# Patient Record
Sex: MALE | Race: WHITE | URBAN - METROPOLITAN AREA
[De-identification: names, ages, dates, MRNs, and addresses within clinical notes are randomized per-mention and may not be internally consistent; named-entity substitution may affect disease eponyms.]

---

## 2017-02-23 ENCOUNTER — OFFICE VISIT (OUTPATIENT)
Dept: FAMILY MEDICINE | Facility: CLINIC | Age: 52
End: 2017-02-23
Payer: COMMERCIAL

## 2017-02-23 VITALS
WEIGHT: 281.4 LBS | HEART RATE: 72 BPM | SYSTOLIC BLOOD PRESSURE: 118 MMHG | DIASTOLIC BLOOD PRESSURE: 72 MMHG | BODY MASS INDEX: 38.16 KG/M2

## 2017-02-23 DIAGNOSIS — D56.3 BETA THALASSEMIA MINOR: Chronic | ICD-10-CM

## 2017-02-23 DIAGNOSIS — F41.9 ANXIETY: ICD-10-CM

## 2017-02-23 DIAGNOSIS — G89.29 CHRONIC PATELLOFEMORAL PAIN OF LEFT KNEE: ICD-10-CM

## 2017-02-23 DIAGNOSIS — F33.1 MAJOR DEPRESSIVE DISORDER, RECURRENT EPISODE, MODERATE (H): Primary | ICD-10-CM

## 2017-02-23 DIAGNOSIS — M25.562 CHRONIC PATELLOFEMORAL PAIN OF LEFT KNEE: ICD-10-CM

## 2017-02-23 PROCEDURE — 99214 OFFICE O/P EST MOD 30 MIN: CPT | Performed by: FAMILY MEDICINE

## 2017-02-23 RX ORDER — QUETIAPINE FUMARATE 200 MG/1
TABLET, FILM COATED ORAL
Qty: 90 TABLET | Refills: 3 | Status: SHIPPED | OUTPATIENT
Start: 2017-02-23 | End: 2020-06-03

## 2017-02-23 NOTE — PROGRESS NOTES
SUBJECTIVE:                                                    Eric Forbes is a 51 year old male who presents to clinic today for the following health issues:      Chief Complaint   Patient presents with     Recheck Medication     Knee left     For a period time he had taken himself off of the Seroquel. However, he has found that he does better when he is on it. It really helps him to sleep better and that improves his overall functioning and anxiety level. He tries to take it early in the evening because if he takes it at bedtime he will be too drowsy the next day.    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 What he describes is some crepitation in the knee with occasional pain. He denies instability or locking          Problem list and histories reviewed & adjusted, as indicated.  Additional history: none              ROS:  Constitutional, HEENT, cardiovascular, pulmonary, gi and gu systems are negative, except as otherwise noted.        OBJECTIVE:                                                    /72 (Cuff Size: Adult Large)  Pulse 72  Wt 281 lb 6.4 oz (127.6 kg)  BMI 38.16 kg/m2    GENERAL: healthy, alert and no distress  EYES: Eyes grossly normal to inspection, extraocular movements - intact, and PERRL  MS: Left knee exam; no effusion or warmth, no joint line tenderness; medial and lateral collateral ligaments are intact stressing, Lachman's test negative; moderate pain with palpation on the inferior aspect of the patella  SKIN: Reddish purple scar on the lateral inferior aspect of the skin over the patella from a healed abrasion; no sign of active infection  PSYCH: Alert and oriented times 3; coherent speech, normal rate and volume; does tend to perseverate about worries about serious problem such as flesh  eating bacteria; moderate anxiety       ASSESSMENT/PLAN:                                                    ASSESSMENT:  1. Major depressive disorder, recurrent episode, moderate (H)    2. Beta thalassemia minor    3. Chronic patellofemoral pain of left knee    4. Anxiety      Discussed pathophysiology of patellofemoral pain and implications.  Questions answered.     PLAN:  Orders Placed This Encounter     FOLIC ACID PO     QUEtiapine (SEROQUEL) 200 MG tablet       Patient Instructions   For the knee, do quad strengthening exercises daily (straight leg lifts) and avoid squatting and kneeling      BELINDA Goldberg  [facility]

## 2017-02-23 NOTE — NURSING NOTE
Chief Complaint   Patient presents with     Recheck Medication     Knee left       Initial /72 (Cuff Size: Adult Large)  Pulse 72  Wt 281 lb 6.4 oz (127.6 kg)  BMI 38.16 kg/m2 Estimated body mass index is 38.16 kg/(m^2) as calculated from the following:    Height as of 12/29/16: 6' (1.829 m).    Weight as of this encounter: 281 lb 6.4 oz (127.6 kg).  Medication Reconciliation: complete   Tonia Card CMA

## 2017-02-23 NOTE — PATIENT INSTRUCTIONS
For the knee, do quad strengthening exercises daily (straight leg lifts) and avoid squatting and kneeling

## 2017-02-23 NOTE — MR AVS SNAPSHOT
"              After Visit Summary   2017    Eric Forbes    MRN: [id number]           Patient Information     Date Of Birth          1965        Visit Information        Provider Department      2017 8:00 AM BELINDA Goldberg MD [facility]        Today's Diagnoses     Major depressive disorder, recurrent episode, moderate (H)    -  1      Care Instructions    For the knee, do quad strengthening exercises daily (straight leg lifts) and avoid squatting and kneeling        Follow-ups after your visit        Who to contact     If you have questions or need follow up information about today's clinic visit or your schedule please contact [facility] directly at [phone].  Normal or non-critical lab and imaging results will be communicated to you by MyChart, letter or phone within 4 business days after the clinic has received the results. If you do not hear from us within 7 days, please contact the clinic through MyChart or phone. If you have a critical or abnormal lab result, we will notify you by phone as soon as possible.  Submit refill requests through [employer] or call your pharmacy and they will forward the refill request to us. Please allow 3 business days for your refill to be completed.          Additional Information About Your Visit        MyChart Information     [employer] lets you send messages to your doctor, view your test results, renew your prescriptions, schedule appointments and more. To sign up, go to [email or web address] . Click on \"Log in\" on the left side of the screen, which will take you to the Welcome page. Then click on \"Sign up Now\" on the right side of the page.     You will be asked to enter the access code listed below, as well as some personal information. Please follow the directions to create your username and password.     Your access code is: [email or web address]  Expires: 3/29/2017  8:09 AM     Your access code will  in 90 days. If you " need help or a new code, please call your [facility] clinic or [phone].        Care EveryWhere ID     This is your Care EveryWhere ID. This could be used by other organizations to access your [facility] medical records  [id number]        Your Vitals Were     Pulse BMI (Body Mass Index)                72 38.16 kg/m2           Blood Pressure from Last 3 Encounters:   02/23/17 118/72   12/29/16 (!) 153/92   05/27/16 138/86    Weight from Last 3 Encounters:   02/23/17 281 lb 6.4 oz (127.6 kg)   12/29/16 291 lb (132 kg)   05/27/16 275 lb (124.7 kg)              Today, you had the following     No orders found for display         Today's Medication Changes          These changes are accurate as of: 2/23/17  8:24 AM.  If you have any questions, ask your nurse or doctor.               Start taking these medicines.        Dose/Directions    QUEtiapine 200 MG tablet   Commonly known as:  SEROquel   Used for:  Major depressive disorder, recurrent episode, moderate (H)   Started by:  BELINDA Goldberg MD        One tablet in the evening   Quantity:  90 tablet   Refills:  3            Where to get your medicines      These medications were sent to [place] [address] [address] [facility] [address]     Phone:  [phone]     QUEtiapine 200 MG tablet                Primary Care Provider Office Phone # Fax #    Rj Sharpe [phone]       [facility] [address] [facility] [address]        Thank you!     Thank you for choosing [facility]  for your care. Our goal is always to provide you with excellent care. Hearing back from our patients is one way we can continue to improve our services. Please take a few minutes to complete the written survey that you may receive in the mail after your visit with us. Thank you!             Your Updated Medication List - Protect others around you: Learn how to safely use, store and throw away your  medicines at www.disposemymeds.org.          This list is accurate as of: 2/23/17  8:24 AM.  Always use your most recent med list.                   Brand Name Dispense Instructions for use    cyclobenzaprine 5 MG tablet    FLEXERIL    30 tablet    Take 1 tablet (5 mg) by mouth nightly as needed for muscle spasms       FOLIC ACID PO      Take 1 mg by mouth daily       LORazepam 2 MG tablet    ATIVAN     Reported on 2/23/2017       NONFORMULARY          QUEtiapine 200 MG tablet    SEROquel    90 tablet    One tablet in the evening       VITAMIN D PO      5000 u daily  given by his chiropractor

## 2017-02-24 ASSESSMENT — PATIENT HEALTH QUESTIONNAIRE - PHQ9: SUM OF ALL RESPONSES TO PHQ QUESTIONS 1-9: 10

## 2018-06-15 LAB
ALT SERPL-CCNC: 17 IU/L (ref 8–45)
AST SERPL-CCNC: 17 IU/L (ref 2–40)
CREAT SERPL-MCNC: 0.99 MG/DL (ref 0.72–1.25)
GFR SERPL CREATININE-BSD FRML MDRD: >60 ML/MIN/1.73M2
GLUCOSE SERPL-MCNC: 77 MG/DL (ref 65–100)
POTASSIUM SERPL-SCNC: 4.2 MMOL/L (ref 3.5–5)
TSH SERPL-ACNC: 0.92 UIU/ML (ref 0.35–4.94)

## 2018-09-06 ENCOUNTER — TRANSFERRED RECORDS (OUTPATIENT)
Dept: HEALTH INFORMATION MANAGEMENT | Facility: CLINIC | Age: 53
End: 2018-09-06

## 2019-08-16 ENCOUNTER — ANCILLARY PROCEDURE (OUTPATIENT)
Dept: GENERAL RADIOLOGY | Facility: CLINIC | Age: 54
End: 2019-08-16
Attending: NURSE PRACTITIONER
Payer: COMMERCIAL

## 2019-08-16 ENCOUNTER — NURSE TRIAGE (OUTPATIENT)
Dept: NURSING | Facility: CLINIC | Age: 54
End: 2019-08-16

## 2019-08-16 ENCOUNTER — OFFICE VISIT (OUTPATIENT)
Dept: FAMILY MEDICINE | Facility: CLINIC | Age: 54
End: 2019-08-16
Payer: COMMERCIAL

## 2019-08-16 VITALS
TEMPERATURE: 97.1 F | SYSTOLIC BLOOD PRESSURE: 138 MMHG | WEIGHT: 275 LBS | OXYGEN SATURATION: 97 % | HEIGHT: 72 IN | DIASTOLIC BLOOD PRESSURE: 70 MMHG | BODY MASS INDEX: 37.25 KG/M2 | HEART RATE: 86 BPM

## 2019-08-16 DIAGNOSIS — M16.0 PRIMARY OSTEOARTHRITIS OF BOTH HIPS: Primary | ICD-10-CM

## 2019-08-16 DIAGNOSIS — M25.552 BILATERAL HIP PAIN: Primary | ICD-10-CM

## 2019-08-16 DIAGNOSIS — G89.29 CHRONIC PAIN OF BOTH KNEES: ICD-10-CM

## 2019-08-16 DIAGNOSIS — M25.552 BILATERAL HIP PAIN: ICD-10-CM

## 2019-08-16 DIAGNOSIS — M25.551 BILATERAL HIP PAIN: Primary | ICD-10-CM

## 2019-08-16 DIAGNOSIS — M25.561 CHRONIC PAIN OF BOTH KNEES: ICD-10-CM

## 2019-08-16 DIAGNOSIS — M25.562 CHRONIC PAIN OF BOTH KNEES: ICD-10-CM

## 2019-08-16 DIAGNOSIS — M25.569 KNEE PAIN: ICD-10-CM

## 2019-08-16 DIAGNOSIS — M25.551 BILATERAL HIP PAIN: ICD-10-CM

## 2019-08-16 PROCEDURE — 99213 OFFICE O/P EST LOW 20 MIN: CPT | Performed by: NURSE PRACTITIONER

## 2019-08-16 PROCEDURE — 73523 X-RAY EXAM HIPS BI 5/> VIEWS: CPT

## 2019-08-16 PROCEDURE — 73560 X-RAY EXAM OF KNEE 1 OR 2: CPT | Mod: LT

## 2019-08-16 ASSESSMENT — MIFFLIN-ST. JEOR: SCORE: 2135.39

## 2019-08-16 NOTE — PATIENT INSTRUCTIONS
Your clinic record indicates that you are due for:   Colonoscopy or yearly stool blood testing (FIT)        Thank you for choosing [facility].  You may be receiving an email and/or telephone survey request from [facility] [employer] regarding your visit today.  Please take a few minutes to respond to the survey to let us know how we are doing.      If you have questions or concerns, please contact us via [employer] or you can contact your care team at [phone].    Our Clinic hours are:  Monday 6:40 am  to 7:00 pm  Tuesday -Friday 6:40 am to 5:00 pm    The [place] outpatient lab hours are:  Monday - Friday 6:10 am to 4:45 pm  Saturdays 7:00 am to 11:00 am  Appointments are required, call [phone]    If you have clinical questions after hours or would like to schedule an appointment,  call the clinic at [phone].

## 2019-08-16 NOTE — TELEPHONE ENCOUNTER
Pt asking for results of recent labs to be mailed to him. Informed clinic now closed. Advised call back Mon 8/19 when clinic reopens.     Reason for Disposition    [1] Caller requesting NON-URGENT health information AND [2] PCP's office is the best resource    Additional Information    Negative: [1] Caller is not with the adult (patient) AND [2] reporting urgent symptoms    Negative: Lab result questions    Negative: Medication questions    Negative: Caller can't be reached by phone    Negative: Caller has already spoken to PCP or another triager    Negative: RN needs further essential information from caller in order to complete triage    Negative: Requesting regular office appointment    Protocols used: INFORMATION ONLY CALL-A-

## 2019-08-16 NOTE — PROGRESS NOTES
"Subjective     Eric Forbes is a 53 year old male who presents to clinic today for the following health issues:    HPI   Musculoskeletal problem/pain      Duration: knee pain for a couple years    Bilateral Hip pain  8 months- comes and goes; getting worse    Description  Location: bilateral hip and knee pain    Intensity:  6/10    Accompanying signs and symptoms:     numbness and tingling in right leg    No redness or swelling.    No weakness.    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 Doesn't take any over-the-counter pain medications, states \"it's not bad enough\"              Reviewed and updated as needed this visit by Provider  Tobacco  Allergies  Meds  Problems  Med Hx  Surg Hx  Fam Hx         Review of Systems   ROS COMP: Constitutional, HEENT, cardiovascular, pulmonary, gi and gu systems are negative, except as otherwise noted.      Objective    /70 (BP Location: Right arm)   Pulse 86   Temp 97.1  F (36.2  C) (Tympanic)   Ht 1.829 m (6')   Wt 124.7 kg (275 lb)   SpO2 97%   BMI 37.30 kg/m    Body mass index is 37.3 kg/m .  Physical Exam   GENERAL: healthy, alert and no distress  MS: hip exam Hip appears normal, non-tender and normal range of motion  knee exam normal knee exam, no swelling, tenderness, effusion or instability; ligaments intact, full ROM.            Assessment & Plan       ICD-10-CM    1. Bilateral hip pain M25.551 XR Pelvis and Hip Bilateral 2 Views    M25.552    2. Chronic pain of both knees M25.561     M25.562     G89.29      Suspect osteoarthritis is cause of pain.  Will obtain xrays today to further assess.  Discussed weight loss.  Discussed " NSAIDs   Discussed importance of strength building and exercising.  Discussed option of steroid injections.  Patient will decide after xrays are completed.     BMI:   Estimated body mass index is 37.3 kg/m  as calculated from the following:    Height as of this encounter: 1.829 m (6').    Weight as of this encounter: 124.7 kg (275 lb).   Weight management plan: Discussed healthy diet and exercise guidelines        Return in about 2 weeks (around 8/30/2019), or if symptoms worsen or fail to improve.    The risks, benefits and treatment options of prescribed medications or other treatments have been discussed with the patient. The patient verbalized their understanding and should call or follow up if no improvement or if they develop further problems.    KAMALJIT Donovan CNP  [facility]

## 2019-10-25 ENCOUNTER — TELEPHONE (OUTPATIENT)
Dept: FAMILY MEDICINE | Facility: CLINIC | Age: 54
End: 2019-10-25

## 2019-10-25 NOTE — LETTER
October 25, 2019      Eric Forbes  [address] [facility] [address]        Dear Eric Forbes, [id number]    At [facility] we care about your health and are committed to providing quality patient care, which includes staying current on preventative cancer screenings.  You can increase your chances of finding and treating cancers through regular screenings.      Our records show that you are due for the following screening(s):    Colon Cancer Screening- Recommended every 5-10 years, depending on your history, in order to prevent and detect colon cancer at its earliest stages.  Colon cancer is now the second leading cause of death in the [place] for both men and women and there are over 130,000 new cases and 50,000 deaths per year from colon cancer.  Colonoscopies can prevent 90-95% of these deaths.  Problem lesions can be removed before they ever become cancer.  This test is not only looking for cancer, but also getting rid of precancerous lesions.  You are usually given some sedation which makes the test very comfortable for most people.     If you do not wish to do a colonoscopy or cannot afford to do one, at this time, there is another option.  It is called a Fit test or Fecal Immunochemical Occult Blood Test (take home stool sample kit).  It does not replace the colonoscopy for colorectal cancer screening, but it can detect hidden bleeding in the lower colon.  It does need to be repeated every year and if a positive result is obtained, you would be referred for a colonoscopy.  The FIT test is really easy to do and does not require any diet or medication restrictions and involves only one collection sample.       If you have completed either one of these tests or had a flexible sigmoidoscopy in the past five years at another facility, please let us know so that we can update your records.  Please call us ([phone]) if you have questions or would like to arrange either to do a  colonoscopy or obtain the necessary test kit for the Fecal Occult Test.         Quality Committee   [facility]

## 2019-10-25 NOTE — TELEPHONE ENCOUNTER
Panel Management Review          Composite cancer screening  Chart review shows that this patient is due/due soon for the following Colonoscopy  Summary:    Patient is due/failing the following:   COLONOSCOPY    Action needed:   Patient needs referral/order: for colonoscopy vs. FIT    Type of outreach:    Sent letter.    Questions for provider review:    None                                                                                                                                    TOBY LUX

## 2020-03-05 ENCOUNTER — TELEPHONE (OUTPATIENT)
Dept: FAMILY MEDICINE | Facility: CLINIC | Age: 55
End: 2020-03-05

## 2020-03-05 NOTE — LETTER
[facility]  [address] [facility] [address]  [phone]    March 5, 2020    Eric Forbes  [address] [facility] [address]          Dear Eric,    --Colon cancer screening is generally recommended in all patients over the age of 50 years of age. This can be with either colonoscopy every 10 years, or testing the stool for blood one time per year (called a FIT test, a simple card you can send back to us in the mail). On review of our electronic medical record it appears you are due for colon cancer screening. Please call the clinic to assist in setting up a colonoscopy or, if you prefer, setting up the test for stool blood(FIT).    If you have had this test at an outside facility, please let us know so that we can update your record.     Please disregard this notice if you have already scheduled an appointment.     Sincerely,    Raine ARRIAGA  [facility] - [phone]  [email or web address]

## 2020-03-05 NOTE — TELEPHONE ENCOUNTER
Panel Management Review          Composite cancer screening  Chart review shows that this patient is due/due soon for the following Colonoscopy  Summary:    Patient is due/failing the following:   COLONOSCOPY    Action needed:   Patient needs referral/order: for colonoscopy vs. FIT    Type of outreach:    Sent letter.    Questions for provider review:    None                                                                                                                                    TOBY ULX

## 2020-03-12 ENCOUNTER — TELEPHONE (OUTPATIENT)
Dept: FAMILY MEDICINE | Facility: CLINIC | Age: 55
End: 2020-03-12

## 2020-03-12 NOTE — TELEPHONE ENCOUNTER
Patient notified he will need an appt for this.   He has not tried OTC meds and will try.  Will make appt when he returns to town.  Sera CASTILLO RN

## 2020-03-12 NOTE — TELEPHONE ENCOUNTER
Reason for Call:  Other prescription    Detailed comments: Patient is calling to request a sleeping medication.  States that he was on Seroquel a couple years ago but he always felt tired on that.  Patient is out of town for work so can not come in for an appt.  He will be out of town for a couple weeks.  States that he needs something today.  [facility]  Address: [address] [place] [address], [place] [address].   - [phone]    Phone Number Patient can be reached at: Cell number on file:    Telephone Information:   Mobile [phone]       Best Time: Any    Can we leave a detailed message on this number? YES    Call taken on 3/12/2020 at 9:27 AM by Jessica Garcia

## 2020-05-14 ENCOUNTER — TRANSFERRED RECORDS (OUTPATIENT)
Dept: HEALTH INFORMATION MANAGEMENT | Facility: CLINIC | Age: 55
End: 2020-05-14

## 2020-06-03 ENCOUNTER — VIRTUAL VISIT (OUTPATIENT)
Dept: FAMILY MEDICINE | Facility: CLINIC | Age: 55
End: 2020-06-03
Payer: COMMERCIAL

## 2020-06-03 DIAGNOSIS — G47.00 INSOMNIA, UNSPECIFIED TYPE: ICD-10-CM

## 2020-06-03 DIAGNOSIS — F32.1 MODERATE MAJOR DEPRESSION (H): Primary | ICD-10-CM

## 2020-06-03 DIAGNOSIS — G47.33 OSA (OBSTRUCTIVE SLEEP APNEA): ICD-10-CM

## 2020-06-03 PROCEDURE — 99214 OFFICE O/P EST MOD 30 MIN: CPT | Mod: 95 | Performed by: FAMILY MEDICINE

## 2020-06-03 RX ORDER — TRAZODONE HYDROCHLORIDE 100 MG/1
100 TABLET ORAL AT BEDTIME
Qty: 90 TABLET | Refills: 3 | Status: SHIPPED | OUTPATIENT
Start: 2020-06-03 | End: 2022-06-03

## 2020-06-03 RX ORDER — TRAZODONE HYDROCHLORIDE 100 MG/1
100 TABLET ORAL
COMMUNITY
Start: 2020-05-14 | End: 2020-06-03

## 2020-06-03 RX ORDER — SERTRALINE HYDROCHLORIDE 100 MG/1
150 TABLET, FILM COATED ORAL DAILY
Qty: 135 TABLET | Refills: 3 | Status: SHIPPED | OUTPATIENT
Start: 2020-06-03 | End: 2020-12-30

## 2020-06-03 ASSESSMENT — ANXIETY QUESTIONNAIRES
1. FEELING NERVOUS, ANXIOUS, OR ON EDGE: MORE THAN HALF THE DAYS
6. BECOMING EASILY ANNOYED OR IRRITABLE: SEVERAL DAYS
5. BEING SO RESTLESS THAT IT IS HARD TO SIT STILL: SEVERAL DAYS
GAD7 TOTAL SCORE: 14
2. NOT BEING ABLE TO STOP OR CONTROL WORRYING: NEARLY EVERY DAY
7. FEELING AFRAID AS IF SOMETHING AWFUL MIGHT HAPPEN: SEVERAL DAYS
IF YOU CHECKED OFF ANY PROBLEMS ON THIS QUESTIONNAIRE, HOW DIFFICULT HAVE THESE PROBLEMS MADE IT FOR YOU TO DO YOUR WORK, TAKE CARE OF THINGS AT HOME, OR GET ALONG WITH OTHER PEOPLE: SOMEWHAT DIFFICULT
3. WORRYING TOO MUCH ABOUT DIFFERENT THINGS: NEARLY EVERY DAY

## 2020-06-03 ASSESSMENT — PATIENT HEALTH QUESTIONNAIRE - PHQ9
SUM OF ALL RESPONSES TO PHQ QUESTIONS 1-9: 14
5. POOR APPETITE OR OVEREATING: NEARLY EVERY DAY

## 2020-06-03 NOTE — PROGRESS NOTES
"Eric Forbes is a 54 year old male who is being evaluated via a billable telephone visit.      The patient has been notified of following:     \"This telephone visit will be conducted via a call between you and your physician/provider. We have found that certain health care needs can be provided without the need for a physical exam.  This service lets us provide the care you need with a short phone conversation.  If a prescription is necessary we can send it directly to your pharmacy.  If lab work is needed we can place an order for that and you can then stop by our lab to have the test done at a later time.    Telephone visits are billed at different rates depending on your insurance coverage. During this emergency period, for some insurers they may be billed the same as an in-person visit.  Please reach out to your insurance provider with any questions.    If during the course of the call the physician/provider feels a telephone visit is not appropriate, you will not be charged for this service.\"    Patient has given verbal consent for Telephone visit?  Yes    What phone number would you like to be contacted at? [phone]    How would you like to obtain your AVS? Mail a copy    Subjective     Eric Forbes is a 54 year old male who presents via phone visit today for the following health issues:    HPI  Depression and Anxiety Follow-Up    How are you doing with your depression since your last visit? Slowly Improved     How are you doing with your anxiety since your last visit?  Slowly Improved     Are you having other symptoms that might be associated with depression or anxiety? Yes:  Still has anxiety attacks but they are better.    Have you had a significant life event? No     Do you have any concerns with your use of alcohol or other drugs? No    Social History     Tobacco Use     Smoking status: Never Smoker     Smokeless tobacco: Never Used   Substance Use Topics     Alcohol use: No     Drug use: No     PHQ " 12/29/2016 2/23/2017 6/3/2020   PHQ-9 Total Score 11 10 14   Q9: Thoughts of better off dead/self-harm past 2 weeks Not at all Not at all Several days     BETZAIDA-7 SCORE 5/27/2016 12/29/2016 6/3/2020   Total Score - - -   Total Score 6 12 14     Reviewed phq9 and betzaida 7.  He has thoughts of SI but would not act on them, no HI.      He states he went up to 200 mg of sertraline for one day and it did not make any changes.  No side effect of the sertraline medication that he is aware of at this time.    He wakes up at night gasping for air. His wife has noted breathing issues at night.  He is trying to lose weight since he knows he weight too much.        Suicide Assessment Five-step Evaluation and Treatment (SAFE-T)      How many servings of fruits and vegetables do you eat daily?  2-3    On average, how many sweetened beverages do you drink each day (Examples: soda, juice, sweet tea, etc.  Do NOT count diet or artificially sweetened beverages)?   2    How many days per week do you exercise enough to make your heart beat faster? 3 or less    How many minutes a day do you exercise enough to make your heart beat faster? 20 - 29    How many days per week do you miss taking your medication? 0               Reviewed and updated as needed this visit by Provider  Tobacco  Allergies  Meds  Problems  Med Hx  Surg Hx  Fam Hx         Review of Systems   Review Of Systems  Skin: negative  Eyes:   Ears/Nose/Throat:   Respiratory: No shortness of breath, dyspnea on exertion, cough, or hemoptysis  Cardiovascular: negative  Gastrointestinal: negative  Genitourinary:   Musculoskeletal: negative  Neurologic: negative  Psychiatric: as above  Hematologic/Lymphatic/Immunologic:   Endocrine:          Objective   Reported vitals:  There were no vitals taken for this visit.   healthy, alert and no distress  PSYCH: Alert and oriented times 3; coherent speech, normal   rate and volume, able to articulate logical thoughts, able   to abstract  reason, no tangential thoughts, no hallucinations   or delusions  His affect is normal  RESP: No cough, no audible wheezing, able to talk in full sentences  Remainder of exam unable to be completed due to telephone visits            Assessment/Plan:  1. Moderate major depression (H)  Will increase his medication to 150 mg and follow up, discussed med and side effect and what to look for drug interaction  - sertraline (ZOLOFT) 100 MG tablet; Take 1.5 tablets (150 mg) by mouth daily  Dispense: 135 tablet; Refill: 3    2. FESTUS (obstructive sleep apnea)  Referral is done and get consult  - SLEEP EVALUATION & MANAGEMENT REFERRAL - [facility] [place]  [phone] (Age 15 and up); Future    3. Insomnia, unspecified type  Refilled his medication  - traZODone (DESYREL) 100 MG tablet; Take 1 tablet (100 mg) by mouth At Bedtime  Dispense: 90 tablet; Refill: 3    Return in about 3 weeks (around 6/24/2020) for Telephone visit.      Phone call duration:  21 minutes    Rj Sharpe MD

## 2020-06-03 NOTE — PATIENT INSTRUCTIONS
Maryjane Reyes,    For your gasping and choking at night I am suspecting that you have obstructive sleep apnea. I have attached some information.  Please start of by getting a sleep consult.  The phone number is below.    SLEEP EVALUATION & MANAGEMENT REFERRAL - ADULT -[facility] - [place]  [phone] (Age 15 and up) [[id number]]     Electronically signed by: Rj Sharpe MD on 06/03/20 1126  Status: Active    Ordering user: Rj Sharpe MD 06/03/20 1126          Regarding your insomnia, please continue with the trazodone 100 mg at night. I did send through refills for this medication.    Regarding your depression and anxiety you reported that you were taking sertraline 100 mg.  Please increase the dose to 150 mg once a day.  Please make a telephone visit in 3 weeks.  I have sent refills to your pharmacy.    Regarding the potential for drug interaction causing too much of the sertraline below is the list that could be signs of too much medication.      When to call your healthcare provider  Call your healthcare provider right away if you have any of the following:    Increased feelings of depression    Unusual joint or muscle pain    Trouble breathing    Shaking chills    Feelings of too much excitement    Trouble controlling your emotions or actions    Skin rash (hives)    Tremors   Date Last Reviewed: 5/1/2017    Sincerely,  Rj Sharpe MD      Thank you for choosing [facility].  You may be receiving an email and/or telephone survey request from [facility] regarding your visit today.  Please take a few minutes to respond to the survey to let us know how we are doing.      If you have questions or concerns, please contact us via [facility] or you can contact your care team at [phone].    Our Clinic hours are:  Monday 6:40 am  to 7:00 pm  Tuesday -Friday 6:40 am to 5:00 pm    The [place] outpatient lab hours are:  Monday - Friday 6:10 am to 4:45  pm  Saturdays 7:00 am to 11:00 am  Appointments are required, call [phone]    If you have clinical questions after hours or would like to schedule an appointment,  call the clinic at [phone].    Obstructive Sleep Apnea  Obstructive sleep apnea is a condition that causes your air passages to become narrowed or blocked during sleep. As a result, breathing stops for short periods. Your body wakes up enough for breathing to begin again, though you don't remember it. The cycle of stopped breathing and brief awakenings can repeat dozens of times a night. This prevents the body from getting to the deeper stages of sleep that are needed for good rest and may cause your body's oxygen level to fall.  Signs of sleep apnea include loud snoring, noisy breathing, and gasping sounds during sleep. Daytime symptoms include waking up tired after a full night's sleep, waking up with headaches, feeling very sleepy or falling asleep during the day, and having problems with memory or concentration.  Risk factors for sleep apnea include:    Being overweight    Being a man, or a woman in menopause    Smoking    Using alcohol or sedating medicines    Having enlarged structures in the nose or throat  Home care  Lifestyle changes that can help treat snoring and sleep apnea include the following:    If you are overweight, lose weight. Talk to your healthcare provider about a weight-loss plan for you.    Avoid alcohol for 3 to 4 hours before bedtime. Avoid sedating medications. Ask your healthcare provider about the medicines you take.    If you smoke, talk to your healthcare provider about ways to quit.    Sleep on your side. This can help prevent gravity from pulling relaxed throat tissues into your breathing passages.    If you have allergies or sinus problems that block your nose, ask your healthcare provider for help.  Follow-up care  Follow up with your healthcare provider, or as advised. A diagnosis of sleep apnea is made with  a sleep study. Your healthcare provider can tell you more about this test.  When to seek medical advice  Sleep apnea can make you more likely to have certain health problems. These include high blood pressure, heart attack, stroke, and sexual dysfunction. If you have sleep apnea, talk to your healthcare provider about the best treatments for you.  Date Last Reviewed: 4/1/2017 2000-2019 [employer]. [address] [place] [address], [place] [address]. All rights reserved. This information is not intended as a substitute for professional medical care. Always follow your healthcare professional's instructions.

## 2020-06-04 ASSESSMENT — ANXIETY QUESTIONNAIRES: GAD7 TOTAL SCORE: 14

## 2020-06-10 ENCOUNTER — VIRTUAL VISIT (OUTPATIENT)
Dept: SLEEP MEDICINE | Facility: CLINIC | Age: 55
End: 2020-06-10
Attending: FAMILY MEDICINE
Payer: COMMERCIAL

## 2020-06-10 DIAGNOSIS — G47.33 OSA (OBSTRUCTIVE SLEEP APNEA): Primary | ICD-10-CM

## 2020-06-10 PROCEDURE — 99203 OFFICE O/P NEW LOW 30 MIN: CPT | Mod: TEL | Performed by: PHYSICIAN ASSISTANT

## 2020-06-10 RX ORDER — CALCIUM/MAGNESIUM/ZINC 333-133 MG
TABLET ORAL
COMMUNITY

## 2020-06-10 RX ORDER — MAGNESIUM CITRATE
POWDER (GRAM) MISCELLANEOUS
COMMUNITY

## 2020-06-10 NOTE — PROGRESS NOTES
"Eric Forbes is a 54 year old male who is being evaluated via a billable telephone visit.      The patient has been notified of following:     \"This telephone visit will be conducted via a call between you and your physician/provider. We have found that certain health care needs can be provided without the need for a physical exam.  This service lets us provide the care you need with a short phone conversation.  If a prescription is necessary we can send it directly to your pharmacy.  If lab work is needed we can place an order for that and you can then stop by our lab to have the test done at a later time.    Telephone visits are billed at different rates depending on your insurance coverage. During this emergency period, for some insurers they may be billed the same as an in-person visit.  Please reach out to your insurance provider with any questions.    If during the course of the call the physician/provider feels a telephone visit is not appropriate, you will not be charged for this service.\"    Patient has given verbal consent for Telephone visit?  Yes    What phone number would you like to be contacted at? [phone]    How would you like to obtain your AVS? Mail a copy    Phone call duration: 45 minutes    Bahman Colbert PA-C     Does Eric have a CPAP/Bipap?  No     [place] Sleep Scale: 5    Sleep Consultation:    Date on this visit: 6/10/2020    Eric Forbes is sent by Rj Sharpe for a sleep consultation regarding choking at night.     Primary Physician: Rj Sharpe     Eric Fobres reports nightly snoring, gasping, choking, snorting and poor quality of sleep for 10 years.     Eric goes to sleep at 7:30 PM during the week. He wakes up at 2 AM without an alarm will sometimes fall back asleep and sleep until 11.. He falls asleep in  minutes.  Eric has difficulty falling asleep.  He wakes up 5-8 times a night for 60 minutes before falling back to sleep.  Eric wakes up to " snoring.Patient gets an average of 6-7 hours of sleep per night.     Patient does listen to the radio. .     Eric does not do shift work.  He works day shifts.      Eric does snore every night. Patient does have a regular bed partner. There is report of snoring, gasping, choking, snorting and poor quality of sleep.  He does have witnessed apneas. They always sleep separately.  Patient sleeps on his side. He has frequent sleep disturbance, snort arousals and morning headaches,. Eric has occasional bruxism and sleep paralysis.    He confirms sleep walking as a child.  Eric has claustrophobia and depression.      Eric has lost 30 pounds in the last year.  Patient describes themself as neither a morning or night person.  He would prefer to go to sleep at 7:30 PM and wake up at 5:00 AM.  Patient's [place] Sleepiness score 5/24 inconsistent with excessive  daytime sleepiness.      Eric naps 0 times per week  He takes no inadvertant naps.  He denies closing eyes, dozing and falling asleep while driving.  Patient was counseled on the importance of driving while alert, to pull over if drowsy, or nap before getting into the vehicle if sleepy.  He uses 3-4 cups/day of coffee, 1 sodas/day. Last caffeine intake is usually before 1PM.    Allergies:    Allergies   Allergen Reactions     Trazodone Itching       Medications:    Current Outpatient Medications   Medication Sig Dispense Refill     FOLIC ACID PO Take 1 mg by mouth daily       Magnesium Citrate POWD        Milk Thistle-Dand-Fennel-Licor (MILK THISTLE XTRA) CAPS capsule        sertraline (ZOLOFT) 100 MG tablet Take 1.5 tablets (150 mg) by mouth daily 135 tablet 3     sertraline (ZOLOFT) 50 MG tablet Take 50 mg by mouth every morning       traZODone (DESYREL) 100 MG tablet Take 1 tablet (100 mg) by mouth At Bedtime 90 tablet 3     VITAMIN D OR 5000 u daily  given by his chiropractor       NONFORMULARY          Problem List:  Patient Active Problem List    Diagnosis Date  Noted     Beta thalassemia minor 07/27/2015     Priority: Medium     Saw Hematologist in 2014 - Patient advised to take folic acid supplement 1 tablet a day as needed.  Other than that, the patient is advised to avoid vitamin C and iron as well as multivitamin supplementation in order to avoid iron overload.  Otherwise, patient can continue to live a normal life and he is not at risk of any clinical symptoms due to this transformation       Obesity 11/24/2014     Priority: Medium     Refused influenza vaccine 11/20/2014     Priority: Medium     Moderate major depression (H) 05/30/2013     Priority: Medium     CARDIOVASCULAR SCREENING; LDL GOAL LESS THAN 160 10/31/2010     Priority: Medium     Anxiety 10/23/2009     Priority: Medium     Lumbago 07/08/2008     Priority: Medium     Head injury 02/19/2007     Priority: Medium     Problem list name updated by automated process. Provider to review          Past Medical/Surgical History:  Past Medical History:   Diagnosis Date     Depressive disorder, not elsewhere classified      Past Surgical History:   Procedure Laterality Date     NO HISTORY OF SURGERY         Social History:  Social History     Socioeconomic History     Marital status: Single     Spouse name: Not on file     Number of children: Not on file     Years of education: Not on file     Highest education level: Not on file   Occupational History     Not on file   Social Needs     Financial resource strain: Not on file     Food insecurity     Worry: Not on file     Inability: Not on file     Transportation needs     Medical: Not on file     Non-medical: Not on file   Tobacco Use     Smoking status: Never Smoker     Smokeless tobacco: Never Used   Substance and Sexual Activity     Alcohol use: No     Drug use: No     Sexual activity: Yes     Partners: Female   Lifestyle     Physical activity     Days per week: Not on file     Minutes per session: Not on file     Stress: Not on file   Relationships     Social  connections     Talks on phone: Not on file     Gets together: Not on file     Attends [employer] service: Not on file     Active member of club or organization: Not on file     Attends meetings of clubs or organizations: Not on file     Relationship status: Not on file     Intimate partner violence     Fear of current or ex partner: Not on file     Emotionally abused: Not on file     Physically abused: Not on file     Forced sexual activity: Not on file   Other Topics Concern     Parent/sibling w/ CABG, MI or angioplasty before 65F 55M? No   Social History Narrative     Not on file       Family History:  Family History   Problem Relation Age of Onset     Hypertension Mother      C.A.D. Maternal Grandfather      C.A.D. Father      Cancer Brother         lung     Family History Negative Other      Cancer - colorectal No family hx of      Prostate Cancer No family hx of          Impression/Plan:  Sleep apnea- HX obesity, depression/anxiety., snoring, gasping,witnessed apneas. Will set up for HST.  Insomnia- sleep onset and maintenance, sleep hygiene reviewed. Recommended a regular sleep schedule.    Literature provided regarding sleep apnea, sleep hygiene and insomnia.      He will follow up with me in approximately two weeks after his sleep study has been competed to review the results and discuss plan of care.       Polysomnography reviewed.  Obstructive sleep apnea reviewed.  Complications of untreated sleep apnea were reviewed.        CC: Rj Sharpe

## 2020-06-10 NOTE — PATIENT INSTRUCTIONS
"MY INFORMATION ON SLEEP APNEA-  Eric Forbes    DOCTOR : Bahman Colbert PA-C  SLEEP CENTER :      MY CONTACT NUMBER:   [facility]  [phone]  [facility]   [phone]  [facility]   [phone]      [facility]  [phone]  [facility]   [phone]      Vasquez Points:  1. What is Obstructive Sleep Apnea (FESTUS)? FESTUS is the most common type of sleep apnea. Apnea literally means, \"without breath.\" It is characterized by repetitive pauses in breathing, despite continued effort to breathe, and is usually associated with a reduction in blood oxygen saturation. Apneas can last 10 to over 60 seconds. It is caused by narrowing or collapse of the upper airway as muscles relax during sleep.   2. What are the consequences of FESTUS? Symptoms include: daytime sleepiness- possibly increasing the risk of falling asleep while driving, unrefreshing/restless sleep, snoring, insomnia, waking frequently to urinate, waking with heartburn or reflux, reduced concentration and memory, and morning headaches. Other health consequences may include development of high blood pressure. Untreated FESTUS also can contribute to heart disease, stroke and diabetes.   3. What are the treatment options? In most situations, sleep apnea is a lifelong disease that must be managed with daily therapy. Continuous Positive Airway (CPAP) is the most reliable treatment. A mouthguard to hold your jaw forward is usually the next most reliable option. Other options include postioning devices (to keep you off your back), nasal valves, tongue retaining device, weight loss, surgery. There is more detail about these options toward the end of this document.  4. What are the most important things to remember about using CPAP?     WHERE CAN I FIND MORE INFORMATION?    American Academy of Sleep Medicine Patient information on sleep " disorders:  http://yoursleep.aasmnet.org    CPAP -  WHY AND HOW?                 Continuous positive airway pressure, or CPAP, is the most effective treatment for obstructive sleep apnea. It works by blowing room air, through a mask, to hold your throat open. A decision to use CPAP is a major step forward in the pursuit of a healthier life. The successful use of CPAP will help you breathe easier, sleep better and live healthier. Using CPAP can be a positive experience if you keep these gabriel points in mind:  1. Commitment  CPAP is not a quick fix for your problem. It involves a long-term commitment to improve your sleep and your health.    2. Communication  Stay in close communication with both your sleep doctor and your CPAP supplier. Ask lots of questions and seek help when you need it.    3. Consistency  Use CPAP all night, every night and for every nap. You will receive the maximum health benefits from CPAP when you use it every time that you sleep. This will also make it easier for your body to adjust to the treatment.    4. Correction  The first machine and mask that you try may not be the best ones for you. Work with your sleep doctor and your CPAP supplier to make corrections to your equipment selection. Ask about trying a different type of machine or mask if you have ongoing problems. Make sure that your mask is a good fit and learn to use your equipment properly.    5. Challenge  Tell a family member or close friend to ask you each morning if you used your CPAP the previous night. Have someone to challenge you to give it your best effort.    6. Connection   Your adjustment to CPAP will be easier if you are able to connect with others who use the same treatment. Ask your sleep doctor if there is a support group in your area for people who have sleep apnea, or look for one on the Internet.  7. Comfort   Increase your level of comfort by using a saline spray, decongestant or heated humidifier if CPAP irritates  "your nose, mouth or throat. Use your unit's \"ramp\" setting to slowly get used to the air pressure level. There may be soft pads you can buy that will fit over your mask straps. Look on www.CPAP.com for accessories that can help make CPAP use more comfortable.  8. Cleaning   Clean your mask, tubing and headgear on a regular basis. Put this time in your schedule so that you don't forget to do it. Check and replace the filters for your CPAP unit and humidifier.    9. Completion   Although you are never finished with CPAP therapy, you should reward yourself by celebrating the completion of your first month of treatment. Expect this first month to be your hardest period of adjustment. It will involve some trial and error as you find the machine, mask and pressure settings that are right for you.    10. Continuation  After your first month of treatment, continue to make a daily commitment to use your CPAP all night, every night and for every nap.    CPAP-Tips to starting with success:  Begin using your CPAP for short periods of time during the day while you watch TV or read.    Use CPAP every night and for every nap. Using it less often reduces the health benefits and makes it harder for your body to get used to it.    Newer CPAP models are virtually silent; however, if you find the sound of your CPAP machine to be bothersome, place the unit under your bed to dampen the sound.     Make small adjustments to your mask, tubing, straps and headgear until you get the right fit. Tightening the mask may actually worsen the leak.  If it leaves significant marks on your face or irritates the bridge of your nose, it may not be the best mask for you.  Speak with the person who supplied the mask and consider trying other masks. Insurances will allow you to try different masks during the first month of starting CPAP.  Insurance also covers a new mask, hose and filter about every 6 months.    Use a saline nasal spray to ease mild nasal " congestion. Neti-Pot or saline nasal rinses may also help. Nasal gel sprays can help reduce nasal dryness.  Biotene mouthwash can be helpful to protect your teeth if you experience frequent dry mouth.  Dry mouth may be a sign of air escaping out of your mouth or out of the mask in the case of a full face mask.  Speak with your provider if you expect that is the case.     Take a nasal decongestant to relieve more severe nasal or sinus congestion.  Do not use Afrin (oxymetazoline) nasal spray more than 3 days in a row.  Speak with your sleep doctor if your nasal congestion is chronic.    Use a heated humidifier that fits your CPAP model to enhance your breathing comfort. Adjust the heat setting up if you get a dry nose or throat, down if you get condensation in the hose or mask.  Position the CPAP lower than you so that any condensation in the hose drains back into the machine rather than towards the mask.    Try a system that uses nasal pillows if traditional masks give you problems.    Clean your mask, tubing and headgear once a week. Make sure the equipment dries fully.    Regularly check and replace the filters for your CPAP unit and humidifier.    Work closely with your sleep provider and your CPAP supplier to make sure that you have the machine, mask and air pressure setting that works best for you. It is better to stop using it and call your provider to solve problems than to lay awake all night frustrated with the device.    BESIDES CPAP, WHAT OTHER THERAPIES ARE THERE?    Postioning devices if you only have the snoring or apnea while on your back    Dental devices if your condition is mild    Nasal valves may be effective though experience is limited    Weight loss if you are overweight    Surgery in limited cases where devices are not acceptable or there are problems with structures in the nose and throat  If treated with one of these alternative options, further evaluation is necessary to ensure that the  therapy is effective. This may require some form of repeat testing.      Healthy Lifestyle:  Healthy diet, exercise and limit alcohol: Not only will excessive alcohol increase your weight over time, but it irritates the throat tissues and make them swell, shrinking the airway and causing snoring. Drinking alcohol should be limited and stopped within 3-4 hours before going to bed.   Stop smoking: (Red swollen throat, heat, nicotine), also irritates and swells the airway, among numerous other negative health consequences.    Positioning Device  This example shows a pillow that straps around the waist. It may be appropriate for those whose sleep study shows milder sleep apnea that occurs primarily when lying flat on one's back. Preliminary studies have shown benefit but effectiveness at home should be verified.                      Oral Appliance  These are examples of two of many custom-made devices that are more likely to work in mild sleep apnea   Oral appliances are dental mouth pieces that fit very much like a sports mouth guards or removable orthodontic retainers. They are used to treat snoring and obstructive sleep apnea . The device prevents the airway from collapsing by either supporting the jaw in a forward position. Since oral appliances are non-invasive and easy to use, they may be considered as an early treatment option. Oral appliance therapy (OAT) involves the customization, selection, fabrication, fitting, adjustments and long-term follow-up care.  Custom made oral appliances are proven to be more effective than over-the-counter devices. Therefore, the over-the-counter devices are recommended not to be used as a screening tool nor as a therapeutic option.     Who gets a dental device?  Oral appliance therapy can be used as an alternative to CPAP therapy for the treatment of mild to moderate sleep apnea and for those patients who prefer OAT to CPAP. Oral appliance therapy is a first line therapy for the  treatment of primary snoring. Additionally, OAT is an option for those that cannot tolerate CPAP as therapy or who have experienced insufficient surgical results.                 Possible side effects?  Frequent but minor side effects include: excessive salivation, dry mouth, discomfort of teeth and jaw and temporary changes in the patient s bite.  Potential complications include: jaw pain, permanent occlusal changes and TMJ symptoms.  The above mentioned side effects and complications can be recognized and managed by dentists trained in dental sleep medicine.   Finding a dentist that practices dental sleep medicine  Specific training is available through the American Academy of Dental Sleep Medicine for dentists interested in working in the field of sleep. To find a dentist who is educated in the field of sleep and the use of oral appliances, near you, visit the Web site of the American Academy of Dental Sleep Medicine; also see http://www.accpstorage.org/newOrganization/patients/oralAppliances.pdf   To search for a dentist certified in these practices:  Http://aadsm.org/FindADentist.aspx?1  Nasal Valves              Nasal valves may be an option for mild apnea if other options are not well tolerated. The efficacy of these devices is generally less than CPAP or oral appliances.They may not be effective if you have frequent nasal congestion or have difficulty breathing through your nose.   Weight Loss:    Weight loss decreases severity of sleep apnea in most people with obesity. For those with mild obesity who have developed snoring with weight gain, even 15-30 pound weight loss can improve and occasionally eliminate sleep apnea.  Structured and life-long dietary and health habits are necessary to lose weight and keep healthier weight levels.     Though there are significant health benefits from weight loss, long-term weight loss is very difficult to achieve- studies show success with dietary management in less than  10% of people. In addition, substantial weight loss may require years of dietary control and may be difficult if patients have severe obesity. In these cases, surgical management may be considered.    If you are interested in methods for weight loss, you should review the options discussed at the National Institutes of Health patient information sites:     http://win.niddk.nih.gov/publications/index.htm  http:/www.health.nih.gov/topic/WeightLossDieting    Bariatric programs offer counseling in all methods of weight loss:    Http:/www.[facility].org/Specialties/WeightLossSurgeryandMedicalMgmt/htm    Your BMI is There is no height or weight on file to calculate BMI.        Body mass index (BMI) is one way to tell whether you are at a healthy weight, overweight, or obese. It measures your weight in relation to your height.  A BMI of 18.5 to 24.9 is in the healthy range. A person with a BMI of 25 to 29.9 is considered overweight, and someone with a BMI of 30 or greater is considered obese.  Another way to find out if you are at risk for health problems caused by overweight and obesity is to measure your waist. If you are a woman and your waist is more than 35 inches, or if you are a man and your waist is more than 40 inches, your risk of disease may be higher.  More than two-thirds of American adults are considered overweight or obese. Being overweight or obese increases the risk for further weight gain.  Excess weight may lead to heart disease and diabetes. Creating and following plans for healthy eating and physical activity may help you improve your health.    Methods for maintaining or losing weight.    Weight control is part of healthy lifestyle and includes exercise, emotional health, and healthy eating habits.  Careful eating habits lifelong is the mainstay of weight control.  Though there are significant health benefits from weight loss, long-term weight loss with diet alone may be very difficult to  "achieve- studies show long-term success with dietary management in less than 10% of people. Attaining a healthy weight may be especially difficult to achieve in those with severe obesity. In some cases, medications, devices and surgical management might be considered.    What can you do?    If you are overweight or obese and are interested in methods for weight loss, you should discuss this with your provider. In addition, we recommend that you review healthy life styles and methods for weight loss available through the National Institutes of Health patient information sites:     http://win.niddk.nih.gov/publications/index.htm      Surgery:  There are a number of surgeries that have been attempted to treat apnea. In general, surgical options are usually reserved for cases in which there is a physical abnormality contributing to obstruction or other treatment options are ineffective or not tolerated. Most surgical options are either unreliable or quite invasive. One of the more common procedures is:  Uvulopalatopharyngoplasty: In this procedure, the uvula (the finger-like tissue that hangs in the back of the throat), part of the soft palate (the tissue that the uvula is attached to), and sometimes the tonsils or adenoids are removed. The efficacy of this surgery is around 30-50% .  After surgery, complications may include:  Sleepiness and sleep apnea related to post-surgery medication   Swelling, infection and bleeding   A sore throat and/or difficulty swallowing   Drainage of secretions into the nose and a nasal quality to the voice. English language speech does not seem to be affected by this surgery.   Narrowing of the airway in the nose and throat (hence constricting breathing) snoring and even iatrogenically caused sleep apnea. By cutting the tissues, excess scar tissue can \"tighten\" the airway and make it even smaller than it was before UPPP.  Patients who have had the uvula removed will become unable to " correctly speak Syrian or any other language that has a uvular 'r' phoneme.    Surgeries to help resolve nasal congestion may help reduce the severity of apnea slightly. Nasal congestion does not cause apnea on its own, so these surgeries are usually not performed just for FESTUS.  They may be worth considering if the nasal congestion is significantly bothersome independent of apnea.      Changing Sleep Habits    Sometimes insomnia can be made worse by our own habits or situation.  You should consider making some of the following changes to help improve your sleep:     If there is excessive noise in your house / neighborhood use a fan or similar device to make a quiet background noise that can drown out other noises    If your room is too bright early in the morning, hang a blanket or extra curtain over the windows to keep the light out or use a sleeping mask to cover your eyes    If your room is too warm during the night, set the temperature in the house down a few degrees for the night    Spend a little time before bedtime trying to relax.  Don t work right up until bedtime.  Take 30-60 minutes to relax and unwind before bedtime with a book or watching TV    Do not drink or eat anything with caffeine in it at least 6 hours before bed.    Avoid alcohol at least three hours before bedtime    Do not smoke right before bedtime or during the night    No strenuous exercise for 2-3 hours before bedtime

## 2020-07-10 DIAGNOSIS — Z20.822 SUSPECTED COVID-19 VIRUS INFECTION: ICD-10-CM

## 2020-07-10 DIAGNOSIS — G47.33 OSA (OBSTRUCTIVE SLEEP APNEA): Primary | ICD-10-CM

## 2020-08-13 ENCOUNTER — THERAPY VISIT (OUTPATIENT)
Dept: SLEEP MEDICINE | Facility: CLINIC | Age: 55
End: 2020-08-13
Payer: COMMERCIAL

## 2020-08-13 DIAGNOSIS — G47.33 OSA (OBSTRUCTIVE SLEEP APNEA): Primary | ICD-10-CM

## 2020-08-13 PROCEDURE — 95810 POLYSOM 6/> YRS 4/> PARAM: CPT | Performed by: OTOLARYNGOLOGY

## 2020-08-18 LAB — SLPCOMP: NORMAL

## 2020-12-29 ENCOUNTER — TELEPHONE (OUTPATIENT)
Dept: FAMILY MEDICINE | Facility: CLINIC | Age: 55
End: 2020-12-29

## 2020-12-29 ASSESSMENT — ANXIETY QUESTIONNAIRES
3. WORRYING TOO MUCH ABOUT DIFFERENT THINGS: NOT AT ALL
7. FEELING AFRAID AS IF SOMETHING AWFUL MIGHT HAPPEN: NOT AT ALL
IF YOU CHECKED OFF ANY PROBLEMS ON THIS QUESTIONNAIRE, HOW DIFFICULT HAVE THESE PROBLEMS MADE IT FOR YOU TO DO YOUR WORK, TAKE CARE OF THINGS AT HOME, OR GET ALONG WITH OTHER PEOPLE: SOMEWHAT DIFFICULT
2. NOT BEING ABLE TO STOP OR CONTROL WORRYING: NOT AT ALL
5. BEING SO RESTLESS THAT IT IS HARD TO SIT STILL: NOT AT ALL
6. BECOMING EASILY ANNOYED OR IRRITABLE: MORE THAN HALF THE DAYS
1. FEELING NERVOUS, ANXIOUS, OR ON EDGE: NOT AT ALL
GAD7 TOTAL SCORE: 4

## 2020-12-29 ASSESSMENT — PATIENT HEALTH QUESTIONNAIRE - PHQ9
5. POOR APPETITE OR OVEREATING: MORE THAN HALF THE DAYS
SUM OF ALL RESPONSES TO PHQ QUESTIONS 1-9: 10

## 2020-12-29 NOTE — TELEPHONE ENCOUNTER
Patient Quality Outreach Summary      Summary:    Patient is due/failing the following:   Colonoscopy and PHQ-9 Needed    Type of outreach:    Phone, spoke with the patient.  Questionnaires updated.  No longer taking the zoloft, thinks he has been off since June, feels better without the meds.  Does feel he is improved since June.    PHQ 2/23/2017 6/3/2020 12/29/2020   PHQ-9 Total Score 10 14 10   Q9: Thoughts of better off dead/self-harm past 2 weeks Not at all Several days Not at all     BETZAIDA-7 SCORE 12/29/2016 6/3/2020 12/29/2020   Total Score - - -   Total Score 12 14 4       PHQ-9 ([employer]) 12/29/2020   No Interest In Doing Things    Feeling Depressed    Trouble Sleeping    Tired / No Energy    No appetite or Over-Eating    Feeling Bad about Self    Trouble Concentrating    Moving Slow or Restless    Suicidal Thoughts    Total Score    1.  Little interest or pleasure in doing things 0   2.  Feeling down, depressed, or hopeless 0   3.  Trouble falling or staying asleep, or sleeping too much 3   4.  Feeling tired or having little energy 3   5.  Poor appetite or overeating 2   6.  Feeling bad about yourself 0   7.  Trouble concentrating 2   8.  Moving slowly or restless 0   9.  Suicidal or self-harm thoughts 0   PHQ-9 Total Score 10   Difficulty at work, home, or with people Somewhat difficult   BETZAIDA-7   [employer], 2002; Used with Permission) 12/29/2020   Over the last 2 weeks, how often have you been bothered by feeling nervous, anxious or on edge?    Over the last 2 weeks, how often have you been bothered by not being able to stop or control worrying?    Over the last 2 weeks, how often have you been bothered by worrying too much about different things?    Over the last 2 weeks, how often have you been bothered by trouble relaxing?    Over the last 2 weeks, how often have you been bothered by being so restless that it is hard to sit still?    Over the last 2 weeks, how often have you been bothered by becoming  easily annoyed or irritable?    Over the last 2 weeks, how often have you been bothered by feeling afraid as if something awful might happen?    BETZAIDA-7 Total Score =     1. Feeling nervous, anxious, or on edge 0   2. Not being able to stop or control worrying 0   3. Worrying too much about different things 0   4. Trouble relaxing 2   5. Being so restless that it is hard to sit still 0   6. Becoming easily annoyed or irritable 2   7. Feeling afraid, as if something awful might happen 0   BETZAIDA-7 Total Score 4   If you checked any problems, how difficult have they made it for you to do your work, take care of things at home, or get along with other people? Somewhat difficult       Questions for provider review:    FYI for provider                                                                                                                    MAURO Fernandez MA       Chart routed to Dr. Sharpe.

## 2020-12-30 ASSESSMENT — ANXIETY QUESTIONNAIRES: GAD7 TOTAL SCORE: 4

## 2021-03-31 ENCOUNTER — IMMUNIZATION (OUTPATIENT)
Dept: FAMILY MEDICINE | Facility: CLINIC | Age: 56
End: 2021-03-31
Payer: COMMERCIAL

## 2021-03-31 PROCEDURE — 0011A PR COVID VAC MODERNA 100 MCG/0.5 ML IM: CPT

## 2021-03-31 PROCEDURE — 91301 PR COVID VAC MODERNA 100 MCG/0.5 ML IM: CPT

## 2021-04-03 ENCOUNTER — HEALTH MAINTENANCE LETTER (OUTPATIENT)
Age: 56
End: 2021-04-03

## 2021-04-28 ENCOUNTER — IMMUNIZATION (OUTPATIENT)
Dept: FAMILY MEDICINE | Facility: CLINIC | Age: 56
End: 2021-04-28
Attending: FAMILY MEDICINE
Payer: COMMERCIAL

## 2021-04-28 PROCEDURE — 91301 PR COVID VAC MODERNA 100 MCG/0.5 ML IM: CPT

## 2021-04-28 PROCEDURE — 0012A PR COVID VAC MODERNA 100 MCG/0.5 ML IM: CPT

## 2021-09-18 ENCOUNTER — HEALTH MAINTENANCE LETTER (OUTPATIENT)
Age: 56
End: 2021-09-18

## 2022-03-16 ENCOUNTER — TELEPHONE (OUTPATIENT)
Dept: FAMILY MEDICINE | Facility: CLINIC | Age: 57
End: 2022-03-16
Payer: COMMERCIAL

## 2022-03-16 DIAGNOSIS — Z12.11 SPECIAL SCREENING FOR MALIGNANT NEOPLASMS, COLON: Primary | ICD-10-CM

## 2022-03-16 NOTE — TELEPHONE ENCOUNTER
Dr. Sharpe,    Patient calls asking for a colonoscopy.  States he tried to schedule it himself but was not successful.  He has never had one before.  When asked if he was having symptoms he states yes.  I just got back from [facility] and they cooked spicey food down there.  I have hemorrhoids and have been using tucks for a week now.  Some rectal bleeding on toilet tissue and in toilet for about a week now.  States he has had this all of his life.  Not lethargic, no shortness of air.  Colonoscopy or office visit? Per patient his phone is not ringing so call his wife.  Number provided. Gela CHINO RN

## 2022-03-16 NOTE — TELEPHONE ENCOUNTER
I left a message on patient's confidential voicemail.  Colonoscopy is ordered.   I left him the number to schedule it if they don't contact him with in 2 days.

## 2022-04-24 ENCOUNTER — HEALTH MAINTENANCE LETTER (OUTPATIENT)
Age: 57
End: 2022-04-24

## 2022-05-13 ENCOUNTER — HOSPITAL ENCOUNTER (OUTPATIENT)
Facility: CLINIC | Age: 57
Discharge: HOME | End: 2022-05-13
Attending: SURGERY | Admitting: SURGERY
Payer: COMMERCIAL

## 2022-05-13 NOTE — LETTER
Eric Forbes  [address] [place] [id number]    June 9, 2022    Dear Eric,  This letter is written to inform you of the results of your recent colonoscopy.  Your examination showed polyp(s) in your descending colon, sigmoid colon and rectum. All polyps were removed in their entirety and sent for review by a pathologist. As you will see on the pathology report below, the tissue(s) were tubular adenomatous polyps, hyperplastic polyps and normal colon tissue. Your examination was otherwise without abnormality.    Final Diagnosis   A.  Colon, descending: Polypectomy:  - Fragments of tubular adenoma (3 fragments)  - Separate fragment of nondysplastic colonic mucosa   - No evidence of high-grade dysplasia or invasive malignancy      B.  Colon, sigmoid: Polypectomy:  - Non-dysplastic colonic mucosa      C.  Colon, rectum: Polypectomy:  - Tubular adenoma  - Hyperplastic polyp   - No evidence of high-grade dysplasia or invasive malignancy         Adenomatous polyps are entirely benign (non-cancerous); however, patients who have developed these polyps are at an increased risk for developing additional polyps in the future. If these are not eventually removed, there is a risk of developing colon cancer. We will advise more frequent examinations with you because of the risk associated with this type of polyp.  Hyperplastic polyps are entirely benign (non-cancerous) and rarely associated with the development of additional polyps or colorectal cancer.    Given these findings, I recommend that you undergo a repeat colonoscopy in 5 year(s) for surveillance. We will enter you into a recall system so you receive a reminder closer to the time that you are due for repeat examination.     Please remember that this recommendation is made with the understanding that you are not experiencing persistent changes in bowel function, bleeding per rectum, and/or significant abdominal pain. If you experience these symptoms, please contact your  primary care provider for a further evaluation.     If you have any questions or concerns about the results of your colonoscopy or the appropriate follow-up, please contact my assistant at [phone].    Sincerely,        [facility],   [facility]  ___

## 2022-05-25 ENCOUNTER — ANESTHESIA EVENT (OUTPATIENT)
Dept: GASTROENTEROLOGY | Facility: CLINIC | Age: 57
End: 2022-05-25
Payer: COMMERCIAL

## 2022-05-25 NOTE — ANESTHESIA PREPROCEDURE EVALUATION
Anesthesia Pre-Procedure Evaluation    Patient: Eric Forbes   MRN: [id number] : 1965        Procedure : Procedure(s):  COLONOSCOPY          Past Medical History:   Diagnosis Date     Depressive disorder, not elsewhere classified       Past Surgical History:   Procedure Laterality Date     NO HISTORY OF SURGERY        Allergies   Allergen Reactions     Trazodone Itching      Social History     Tobacco Use     Smoking status: Never Smoker     Smokeless tobacco: Never Used   Substance Use Topics     Alcohol use: No      Wt Readings from Last 1 Encounters:   19 124.7 kg (275 lb)        Anesthesia Evaluation            ROS/MED HX  ENT/Pulmonary:     (+) FESTUS risk factors, obese,     Neurologic: Comment: Hx of head injury      Cardiovascular:     (+) Dyslipidemia -----    METS/Exercise Tolerance:     Hematologic: Comments: Beta thalassemia minor      Musculoskeletal: Comment: Lumbago    (+) arthritis,     GI/Hepatic:  - neg GI/hepatic ROS     Renal/Genitourinary:  - neg Renal ROS     Endo:     (+) Obesity,     Psychiatric/Substance Use:     (+) psychiatric history anxiety and depression     Infectious Disease:  - neg infectious disease ROS     Malignancy:  - neg malignancy ROS     Other:  - neg other ROS          Physical Exam    Airway        Mallampati: II   TM distance: > 3 FB   Neck ROM: full   Mouth opening: > 3 cm    Respiratory Devices and Support         Dental  no notable dental history         Cardiovascular   cardiovascular exam normal          Pulmonary   pulmonary exam normal                OUTSIDE LABS:  CBC:   Lab Results   Component Value Date    WBC 5.4 2016    WBC 5.4 2014    HGB 13.0 (L) 2016    HGB 13.6 2014    HCT 39.5 (L) 2016    HCT 39.9 (L) 2014     2016     2014     BMP:   Lab Results   Component Value Date     2014     2014    POTASSIUM 4.2 06/15/2018    POTASSIUM 5.0 2014    CHLORIDE 107  11/21/2014    CHLORIDE 104 09/02/2014    CO2 31 11/21/2014    CO2 28 09/02/2014    BUN 16 11/21/2014    BUN 14 09/02/2014    CR 0.99 06/15/2018    CR 0.92 11/21/2014    GLC 77 06/15/2018    GLC 92 11/21/2014     COAGS: No results found for: PTT, INR, FIBR  POC: No results found for: BGM, HCG, HCGS  HEPATIC:   Lab Results   Component Value Date    ALBUMIN 4.1 09/02/2014    PROTTOTAL 7.6 09/02/2014    ALT 17 06/15/2018    AST 17 06/15/2018    ALKPHOS 68 09/02/2014    BILITOTAL 0.4 09/02/2014     OTHER:   Lab Results   Component Value Date    MARY 9.0 11/21/2014    TSH 0.92 06/15/2018    T4 1.09 09/02/2014    SED 1 11/26/2007       Anesthesia Plan    ASA Status:  2      Anesthesia Type: General.   Induction: Propofol.   Maintenance: TIVA.        Consents    Anesthesia Plan(s) and associated risks, benefits, and realistic alternatives discussed. Questions answered and patient/representative(s) expressed understanding.     - Discussed: Risks, Benefits and Alternatives for BOTH SEDATION and the PROCEDURE were discussed     - Discussed with:  Patient         Postoperative Care    Pain management: IV analgesics, Oral pain medications, Multi-modal analgesia.   PONV prophylaxis: Ondansetron (or other 5HT-3), Dexamethasone or Solumedrol     Comments:                KAMALJIT Yarbrough CRNA

## 2022-05-27 DIAGNOSIS — Z11.59 ENCOUNTER FOR SCREENING FOR OTHER VIRAL DISEASES: Primary | ICD-10-CM

## 2022-06-03 ENCOUNTER — LAB (OUTPATIENT)
Dept: LAB | Facility: CLINIC | Age: 57
End: 2022-06-03
Payer: COMMERCIAL

## 2022-06-03 DIAGNOSIS — Z11.59 ENCOUNTER FOR SCREENING FOR OTHER VIRAL DISEASES: ICD-10-CM

## 2022-06-03 PROCEDURE — U0005 INFEC AGEN DETEC AMPLI PROBE: HCPCS

## 2022-06-03 PROCEDURE — U0003 INFECTIOUS AGENT DETECTION BY NUCLEIC ACID (DNA OR RNA); SEVERE ACUTE RESPIRATORY SYNDROME CORONAVIRUS 2 (SARS-COV-2) (CORONAVIRUS DISEASE [COVID-19]), AMPLIFIED PROBE TECHNIQUE, MAKING USE OF HIGH THROUGHPUT TECHNOLOGIES AS DESCRIBED BY CMS-2020-01-R: HCPCS

## 2022-06-04 LAB — SARS-COV-2 RNA RESP QL NAA+PROBE: NEGATIVE

## 2022-06-06 ENCOUNTER — SURGERY (OUTPATIENT)
Age: 57
End: 2022-06-06
Payer: COMMERCIAL

## 2022-06-06 ENCOUNTER — ANESTHESIA (OUTPATIENT)
Dept: GASTROENTEROLOGY | Facility: CLINIC | Age: 57
End: 2022-06-06
Payer: COMMERCIAL

## 2022-06-06 VITALS
BODY MASS INDEX: 37.25 KG/M2 | TEMPERATURE: 98.8 F | RESPIRATION RATE: 14 BRPM | WEIGHT: 275 LBS | SYSTOLIC BLOOD PRESSURE: 127 MMHG | DIASTOLIC BLOOD PRESSURE: 82 MMHG | HEIGHT: 72 IN | OXYGEN SATURATION: 95 % | HEART RATE: 72 BPM

## 2022-06-06 LAB — COLONOSCOPY: NORMAL

## 2022-06-06 PROCEDURE — 250N000009 HC RX 250: Performed by: NURSE ANESTHETIST, CERTIFIED REGISTERED

## 2022-06-06 PROCEDURE — 45380 COLONOSCOPY AND BIOPSY: CPT | Performed by: SURGERY

## 2022-06-06 PROCEDURE — 88305 TISSUE EXAM BY PATHOLOGIST: CPT | Performed by: SURGERY

## 2022-06-06 PROCEDURE — 45385 COLONOSCOPY W/LESION REMOVAL: CPT | Performed by: SURGERY

## 2022-06-06 PROCEDURE — 370N000017 HC ANESTHESIA TECHNICAL FEE, PER MIN: Performed by: SURGERY

## 2022-06-06 PROCEDURE — 45380 COLONOSCOPY AND BIOPSY: CPT | Mod: PT | Performed by: SURGERY

## 2022-06-06 PROCEDURE — 45385 COLONOSCOPY W/LESION REMOVAL: CPT | Mod: PT | Performed by: SURGERY

## 2022-06-06 PROCEDURE — 250N000011 HC RX IP 250 OP 636: Performed by: NURSE ANESTHETIST, CERTIFIED REGISTERED

## 2022-06-06 PROCEDURE — 258N000003 HC RX IP 258 OP 636: Performed by: NURSE ANESTHETIST, CERTIFIED REGISTERED

## 2022-06-06 RX ORDER — PROPOFOL 10 MG/ML
INJECTION, EMULSION INTRAVENOUS PRN
Status: DISCONTINUED | OUTPATIENT
Start: 2022-06-06 | End: 2022-06-06

## 2022-06-06 RX ORDER — MEPERIDINE HYDROCHLORIDE 25 MG/ML
12.5 INJECTION INTRAMUSCULAR; INTRAVENOUS; SUBCUTANEOUS
Status: DISCONTINUED | OUTPATIENT
Start: 2022-06-06 | End: 2023-06-07 | Stop reason: HOSPADM

## 2022-06-06 RX ORDER — ONDANSETRON 2 MG/ML
4 INJECTION INTRAMUSCULAR; INTRAVENOUS EVERY 30 MIN PRN
Status: DISCONTINUED | OUTPATIENT
Start: 2022-06-06 | End: 2023-06-07 | Stop reason: HOSPADM

## 2022-06-06 RX ORDER — SODIUM CHLORIDE, SODIUM LACTATE, POTASSIUM CHLORIDE, CALCIUM CHLORIDE 600; 310; 30; 20 MG/100ML; MG/100ML; MG/100ML; MG/100ML
INJECTION, SOLUTION INTRAVENOUS CONTINUOUS
Status: DISCONTINUED | OUTPATIENT
Start: 2022-06-06 | End: 2023-06-07 | Stop reason: HOSPADM

## 2022-06-06 RX ORDER — LIDOCAINE 40 MG/G
CREAM TOPICAL
Status: DISCONTINUED | OUTPATIENT
Start: 2022-06-06 | End: 2023-06-07 | Stop reason: HOSPADM

## 2022-06-06 RX ORDER — OXYCODONE HYDROCHLORIDE 5 MG/1
5 TABLET ORAL EVERY 4 HOURS PRN
Status: DISCONTINUED | OUTPATIENT
Start: 2022-06-06 | End: 2023-06-07 | Stop reason: HOSPADM

## 2022-06-06 RX ORDER — LIDOCAINE HYDROCHLORIDE 10 MG/ML
INJECTION, SOLUTION EPIDURAL; INFILTRATION; INTRACAUDAL; PERINEURAL PRN
Status: DISCONTINUED | OUTPATIENT
Start: 2022-06-06 | End: 2022-06-06

## 2022-06-06 RX ORDER — FENTANYL CITRATE 50 UG/ML
25 INJECTION, SOLUTION INTRAMUSCULAR; INTRAVENOUS
Status: DISCONTINUED | OUTPATIENT
Start: 2022-06-06 | End: 2023-06-07 | Stop reason: HOSPADM

## 2022-06-06 RX ORDER — PROPOFOL 10 MG/ML
INJECTION, EMULSION INTRAVENOUS CONTINUOUS PRN
Status: DISCONTINUED | OUTPATIENT
Start: 2022-06-06 | End: 2022-06-06

## 2022-06-06 RX ORDER — ONDANSETRON 4 MG/1
4 TABLET, ORALLY DISINTEGRATING ORAL EVERY 30 MIN PRN
Status: DISCONTINUED | OUTPATIENT
Start: 2022-06-06 | End: 2023-06-07 | Stop reason: HOSPADM

## 2022-06-06 RX ORDER — HYDROMORPHONE HCL IN WATER/PF 6 MG/30 ML
0.2 PATIENT CONTROLLED ANALGESIA SYRINGE INTRAVENOUS EVERY 5 MIN PRN
Status: DISCONTINUED | OUTPATIENT
Start: 2022-06-06 | End: 2023-06-07 | Stop reason: HOSPADM

## 2022-06-06 RX ORDER — GLYCOPYRROLATE 0.2 MG/ML
INJECTION, SOLUTION INTRAMUSCULAR; INTRAVENOUS PRN
Status: DISCONTINUED | OUTPATIENT
Start: 2022-06-06 | End: 2022-06-06

## 2022-06-06 RX ORDER — FENTANYL CITRATE 50 UG/ML
25 INJECTION, SOLUTION INTRAMUSCULAR; INTRAVENOUS EVERY 5 MIN PRN
Status: DISCONTINUED | OUTPATIENT
Start: 2022-06-06 | End: 2023-06-07 | Stop reason: HOSPADM

## 2022-06-06 RX ADMIN — PROPOFOL 200 MCG/KG/MIN: 10 INJECTION, EMULSION INTRAVENOUS at 12:58

## 2022-06-06 RX ADMIN — GLYCOPYRROLATE 0.1 MG: 0.2 INJECTION, SOLUTION INTRAMUSCULAR; INTRAVENOUS at 12:58

## 2022-06-06 RX ADMIN — PROPOFOL 100 MG: 10 INJECTION, EMULSION INTRAVENOUS at 12:58

## 2022-06-06 RX ADMIN — SODIUM CHLORIDE, POTASSIUM CHLORIDE, SODIUM LACTATE AND CALCIUM CHLORIDE: 600; 310; 30; 20 INJECTION, SOLUTION INTRAVENOUS at 12:42

## 2022-06-06 RX ADMIN — LIDOCAINE HYDROCHLORIDE 50 MG: 10 INJECTION, SOLUTION EPIDURAL; INFILTRATION; INTRACAUDAL; PERINEURAL at 12:58

## 2022-06-06 RX ADMIN — LIDOCAINE HYDROCHLORIDE 0.3 ML: 10 INJECTION, SOLUTION EPIDURAL; INFILTRATION; INTRACAUDAL; PERINEURAL at 12:42

## 2022-06-06 NOTE — ANESTHESIA CARE TRANSFER NOTE
Patient: Eric Forbes    Procedure: Procedure(s):  COLONOSCOPY, FLEXIBLE, WITH LESION REMOVAL USING SNARE  COLONOSCOPY, WITH POLYPECTOMY AND BIOPSY       Diagnosis: Screen for colon cancer [Z12.11]  Diagnosis Additional Information: No value filed.    Anesthesia Type:   General     Note:    Oropharynx: oropharynx clear of all foreign objects  Level of Consciousness: awake  Oxygen Supplementation: room air    Independent Airway: airway patency satisfactory and stable  Dentition: dentition unchanged  Vital Signs Stable: post-procedure vital signs reviewed and stable  Report to RN Given: handoff report given  Patient transferred to: Phase II    Handoff Report: Identifed the Patient, Identified the Reponsible Provider, Reviewed the pertinent medical history, Discussed the surgical course, Reviewed Intra-OP anesthesia mangement and issues during anesthesia, Set expectations for post-procedure period and Allowed opportunity for questions and acknowledgement of understanding      Vitals:  Vitals Value Taken Time   BP     Temp     Pulse     Resp     SpO2         Electronically Signed By: KAMALJIT Montez CRNA  June 6, 2022  1:19 PM

## 2022-06-06 NOTE — H&P
[facility]    Pre-Endoscopy History and Physical     Eric Forbes MRN# [id number]   YOB: 1965 Age: 56 year old     Date of Procedure: (Not on file)  Primary care provider: Rj Sharpe  Type of Endoscopy: Colonoscopy with possible biopsy, possible polypectomy  Reason for Procedure: screening  Type of Anesthesia Anticipated: MAC    HPI:    Eric is a 56 year old male who will be undergoing the above procedure.  1st colonoscopy; no blood thinner; no fmahx of colon cancer    A history and physical has been performed. The patient's medications and allergies have been reviewed. The risks and benefits of the procedure and the sedation options and risks were discussed with the patient.  All questions were answered and informed consent was obtained.      He denies a personal or family history of anesthesia complications or bleeding disorders.     Patient Active Problem List   Diagnosis     Head injury     Lumbago     Anxiety     CARDIOVASCULAR SCREENING; LDL GOAL LESS THAN 160     Moderate major depression (H)     Refused influenza vaccine     Obesity     Beta thalassemia minor        Past Medical History:   Diagnosis Date     Depressive disorder, not elsewhere classified         Past Surgical History:   Procedure Laterality Date     NO HISTORY OF SURGERY         Social History     Tobacco Use     Smoking status: Never Smoker     Smokeless tobacco: Never Used   Substance Use Topics     Alcohol use: No       Family History   Problem Relation Age of Onset     Hypertension Mother      C.A.D. Maternal Grandfather      C.A.D. Father      Cancer Brother         lung     Family History Negative Other      Cancer - colorectal No family hx of      Prostate Cancer No family hx of        Prior to Admission medications    Medication Sig Start Date End Date Taking? Authorizing Provider   FOLIC ACID PO Take 1 mg by mouth daily   Yes Reported, Patient   Magnesium Citrate POWD    Yes Reported,  Patient   Milk Thistle-Dand-Fennel-Licor (MILK THISTLE XTRA) CAPS capsule    Yes Reported, Patient   NONFORMULARY    Yes Reported, Patient   VITAMIN D OR 5000 u daily  given by his chiropractor   Yes Reported, Patient       Allergies   Allergen Reactions     Trazodone Itching     States doesn't like the way he feels on this med        REVIEW OF SYSTEMS:   5 point ROS negative except as noted above in HPI, including Gen., Resp., CV, GI &  system review.    PHYSICAL EXAM:   BP (!) 154/99 (BP Location: Right arm)   Temp 98.2  F (36.8  C) (Oral)   Resp 16   Ht 1.829 m (6')   Wt 124.7 kg (275 lb)   SpO2 98%   BMI 37.30 kg/m   Estimated body mass index is 37.3 kg/m  as calculated from the following:    Height as of this encounter: 1.829 m (6').    Weight as of this encounter: 124.7 kg (275 lb).   Constitutional: Awake, alert, no acute distress.  Eyes: No scleral icterus.  Conjunctiva are without injection.  ENMT: Mucous membranes moist, dentition and gums are intact.   Neck: Soft, supple, trachea midline.    Endocrine: n/a   Lymphatic: There is no cervical, submandibularadenopathy.  Respiratory: normal effortgs   Cardiovascular: S1, S2  Abdomen: Non-distended, non-tender,  No masses,  Musculoskeletal: No spinal or CVA tenderness. Full range of motion in the upper and lower extremities.    Skin: No skin rashes or lesions to inspection.  No petechia.    Neurologic: alerted and oriented 3x  Psychiatric: The patient's affect is not blunted and mood is appropriate.  DIAGNOSTICS:    Not indicated    IMPRESSION   ASA Class 2 - Mild systemic disease    PLAN:   Plan for Colonoscopy with possible biopsy, possible polypectomy. We discussed the risks, benefits and alternatives and the patient wished to proceed.  Patient is cleared for the above procedure.    The above has been forwarded to the consulting provider.    [facility], [facility]

## 2022-06-06 NOTE — PROVIDER NOTIFICATION
06/06/22 1401   Discharge Questionnaire   Pain now? No   Condition of Abdomen upon discharge Soft;Nontender   Oral Intake Tolerated   Diet on Discharge As tolerated    Transportation from procedure Yes      Location Call      :  [facility]   Instructed in Polyps;Post-endoscopy care    Provider Visit Post Procedure? Yes   Patient and/or responsible adult acknowledges understanding of discharge instructions Yes   Valuables   Patient Belongings remains with patient   Patient Belongings Remaining with Patient cell phone/electronics;clothing;shoes   Did you bring any home meds/supplements to the hospital?  No

## 2022-06-06 NOTE — ANESTHESIA POSTPROCEDURE EVALUATION
Patient: Eric Forbes    Procedure: Procedure(s):  COLONOSCOPY, FLEXIBLE, WITH LESION REMOVAL USING SNARE  COLONOSCOPY, WITH POLYPECTOMY AND BIOPSY       Anesthesia Type:  General    Note:  Disposition: Outpatient   Postop Pain Control: Uneventful            Sign Out: Well controlled pain   PONV: No   Neuro/Psych: Uneventful            Sign Out: Acceptable/Baseline neuro status   Airway/Respiratory: Uneventful            Sign Out: Acceptable/Baseline resp. status   CV/Hemodynamics: Uneventful            Sign Out: Acceptable CV status; No obvious hypovolemia; No obvious fluid overload   Other NRE: NONE   DID A NON-ROUTINE EVENT OCCUR? No           Last vitals:  Vitals Value Taken Time   /92 06/06/22 1318   Temp     Pulse 92 06/06/22 1318   Resp     SpO2 94 % 06/06/22 1325   Vitals shown include unvalidated device data.    Electronically Signed By: KAMALJIT Montez CRNA  June 6, 2022  1:27 PM

## 2022-06-08 LAB
PATH REPORT.COMMENTS IMP SPEC: NORMAL
PATH REPORT.COMMENTS IMP SPEC: NORMAL
PATH REPORT.FINAL DX SPEC: NORMAL
PATH REPORT.GROSS SPEC: NORMAL
PATH REPORT.MICROSCOPIC SPEC OTHER STN: NORMAL
PATH REPORT.RELEVANT HX SPEC: NORMAL
PHOTO IMAGE: NORMAL

## 2022-06-08 PROCEDURE — 88305 TISSUE EXAM BY PATHOLOGIST: CPT | Mod: 26 | Performed by: PATHOLOGY

## 2022-09-19 ENCOUNTER — OFFICE VISIT (OUTPATIENT)
Dept: FAMILY MEDICINE | Facility: CLINIC | Age: 57
End: 2022-09-19
Payer: COMMERCIAL

## 2022-09-19 VITALS
BODY MASS INDEX: 39.55 KG/M2 | HEART RATE: 82 BPM | DIASTOLIC BLOOD PRESSURE: 70 MMHG | HEIGHT: 72 IN | WEIGHT: 292 LBS | RESPIRATION RATE: 18 BRPM | OXYGEN SATURATION: 98 % | TEMPERATURE: 97.8 F | SYSTOLIC BLOOD PRESSURE: 130 MMHG

## 2022-09-19 DIAGNOSIS — L91.8 SKIN TAG: Primary | ICD-10-CM

## 2022-09-19 PROCEDURE — 11200 RMVL SKIN TAGS UP TO&INC 15: CPT | Performed by: FAMILY MEDICINE

## 2022-09-19 ASSESSMENT — PATIENT HEALTH QUESTIONNAIRE - PHQ9
SUM OF ALL RESPONSES TO PHQ QUESTIONS 1-9: 1
10. IF YOU CHECKED OFF ANY PROBLEMS, HOW DIFFICULT HAVE THESE PROBLEMS MADE IT FOR YOU TO DO YOUR WORK, TAKE CARE OF THINGS AT HOME, OR GET ALONG WITH OTHER PEOPLE: NOT DIFFICULT AT ALL
SUM OF ALL RESPONSES TO PHQ QUESTIONS 1-9: 1

## 2022-09-19 ASSESSMENT — PAIN SCALES - GENERAL: PAINLEVEL: NO PAIN (0)

## 2022-09-19 NOTE — PROGRESS NOTES
Assessment & Plan     Skin tag  Discussed procedure, verbal consent obtained. Timeout completed. Post care instructions discussed. Reasons to return discussed.   Right anterior chest x1,   Left axilla x3.     Skin tags were cleansed with alcohol. Right anterior chest lesion was anesthetized with lidocaine 1% with epinephrine. Adson with teeth grasped the skin tag and was removed with an iris scissors, cauterized with silver nitrate, bleeding controlled. bandaid applied.     Left axilla skin tags, cleansed with alcohol. adson with teeth grasp skin tags and were removed with iris scissors. Band aid applied. Bleeding controlled.     Tolerated procedure well. No complications.     The risks, benefits and treatment options of prescribed medications or other treatments have been discussed with the patient. The patient verbalized their understanding and should call or follow up if no improvement or if they develop further problems.      Marcel Multani, [facility]     Tutu Reyes is a 57 year old, presenting for the following health issues:  Derm Problem      HPI     Skin tags he wants removed. One on his right anterior chest, 3 under the left arm pit.     Will occasionally get caught on his shirt and bothersome with showering and changing clothes. Wishes to have these removed. No allergies to lidocaine or epinephrine.        Right anterior chest   Skin tag, wishes to have removed. x1     Left arm pit   X3.       Review of Systems   Constitutional, HEENT, cardiovascular, pulmonary, gi and gu systems are negative, except as otherwise noted.      Objective    /70 (BP Location: Left arm, Patient Position: Chair, Cuff Size: Adult Large)   Pulse 82   Temp 97.8  F (36.6  C) (Tympanic)   Resp 18   Ht 1.829 m (6')   Wt 132.5 kg (292 lb)   SpO2 98%   BMI 39.60 kg/m    Body mass index is 39.6 kg/m .  Physical Exam   General: alert, cooperative, no acute distress   Skin: right anterior  chest: skin tag, moderate base, 0.2 mm size   Left axilla: skin tags x3, small base. 0.1 mm size.

## 2022-10-27 ENCOUNTER — OFFICE VISIT (OUTPATIENT)
Dept: FAMILY MEDICINE | Facility: CLINIC | Age: 57
End: 2022-10-27
Payer: COMMERCIAL

## 2022-10-27 ENCOUNTER — ANCILLARY PROCEDURE (OUTPATIENT)
Dept: GENERAL RADIOLOGY | Facility: CLINIC | Age: 57
End: 2022-10-27
Attending: NURSE PRACTITIONER
Payer: COMMERCIAL

## 2022-10-27 VITALS
TEMPERATURE: 97.4 F | HEART RATE: 79 BPM | RESPIRATION RATE: 16 BRPM | DIASTOLIC BLOOD PRESSURE: 70 MMHG | WEIGHT: 289 LBS | OXYGEN SATURATION: 98 % | BODY MASS INDEX: 39.14 KG/M2 | SYSTOLIC BLOOD PRESSURE: 132 MMHG | HEIGHT: 72 IN

## 2022-10-27 DIAGNOSIS — M16.12 PRIMARY OSTEOARTHRITIS OF LEFT HIP: Primary | ICD-10-CM

## 2022-10-27 DIAGNOSIS — M16.12 PRIMARY OSTEOARTHRITIS OF LEFT HIP: ICD-10-CM

## 2022-10-27 PROCEDURE — 73502 X-RAY EXAM HIP UNI 2-3 VIEWS: CPT | Mod: TC | Performed by: RADIOLOGY

## 2022-10-27 PROCEDURE — 99214 OFFICE O/P EST MOD 30 MIN: CPT | Performed by: NURSE PRACTITIONER

## 2022-10-27 ASSESSMENT — ANXIETY QUESTIONNAIRES
GAD7 TOTAL SCORE: 3
5. BEING SO RESTLESS THAT IT IS HARD TO SIT STILL: NOT AT ALL
4. TROUBLE RELAXING: SEVERAL DAYS
3. WORRYING TOO MUCH ABOUT DIFFERENT THINGS: SEVERAL DAYS
2. NOT BEING ABLE TO STOP OR CONTROL WORRYING: NOT AT ALL
7. FEELING AFRAID AS IF SOMETHING AWFUL MIGHT HAPPEN: NOT AT ALL
GAD7 TOTAL SCORE: 3
IF YOU CHECKED OFF ANY PROBLEMS ON THIS QUESTIONNAIRE, HOW DIFFICULT HAVE THESE PROBLEMS MADE IT FOR YOU TO DO YOUR WORK, TAKE CARE OF THINGS AT HOME, OR GET ALONG WITH OTHER PEOPLE: NOT DIFFICULT AT ALL
6. BECOMING EASILY ANNOYED OR IRRITABLE: SEVERAL DAYS
1. FEELING NERVOUS, ANXIOUS, OR ON EDGE: NOT AT ALL

## 2022-10-27 NOTE — PROGRESS NOTES
Assessment & Plan     Primary osteoarthritis of left hip  Patient is currently bone on bone on his Left hip x-ray.  This has clearly worsened since his 2019 x-ray of the hip with bone on bone in the left hip and also in the right but not causing symptoms today.  He comes in today requesting injection in the left hip for pain.  I referred to Ortho to consult on options for treatment due to pain and discussed that an injection would require surgeon to perform this under imaging.  Patient verbalized understanding.  - XR Pelvis and Hip Left 1 View; Future  - Orthopedic  Referral; Future    See Patient Instructions    Peg Mar NP  [facility]    Tutu Reyes is a 57 year old, presenting for the following health issues:  Musculoskeletal Problem (Left hip pain )      HPI      Pain History:  When did you first notice your pain? - Chronic Pain   Have you seen this provider for your pain in the past?   No   Where in your body do your have pain? Left hip pain  Are you seeing anyone else for your pain? No  What makes your pain better? Good night sleep  What makes your pain worse? Walking with heavy stuff  How has pain affected your ability to work? 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 Wife   States he was told to get cortisone shot a couple years ago which she never followed through with and would like to get that now.  During the visit today he clearly is very guarded with the hip but states is not as bad as it was in 2019 with the pain.    Review of Systems   CONSTITUTIONAL: NEGATIVE for fever, chills, change in weight  MUSCULOSKELETAL: POSITIVE  for left hip pain  PSYCHIATRIC: NEGATIVE for changes in mood or affect      Objective    /70   Pulse 79   Temp 97.4  F (36.3  C) (Tympanic)   Resp 16   Ht 1.829 m (6')   Wt 131.1 kg (289 lb)   SpO2 98%   BMI 39.20 kg/m    Body mass index is  39.2 kg/m .  Physical Exam   GENERAL: healthy, alert and no distress  MS: LLE exam shows normal strength and muscle mass, no deformities, no erythema, induration, or nodules, no evidence of joint effusion, no evidence of joint instability and pain with adduction, flexion and extension that is in the anterior hip and goes down the lateral side of the leg without tenderness but only with the movement  PSYCH: mentation appears normal, affect normal/bright    EXAM: PELVIS AND HIP LEFT ONE VIEW  DATE/TIME: 10/27/2022 2:45 PM      INDICATION: Left hip pain.   COMPARISON: 8/16/2019.                                                                      IMPRESSION:  1.  Advanced left hip degenerative arthrosis, progressed. Advanced  joint space narrowing. Moderate-sized focus of subchondral cystic  change in the left acetabular roof. Small degenerative ossicle at the  superolateral joint margin.  2.  Moderate-advanced right hip degenerative arthrosis, stable.  3.  No fracture.     PRO MCPHERSON MD

## 2022-10-27 NOTE — PATIENT INSTRUCTIONS
Make appointment with Ortho for consult on your left hip DJD which is bone on bone.  You can discuss injection versus surgery.

## 2022-11-07 ENCOUNTER — ANCILLARY PROCEDURE (OUTPATIENT)
Dept: GENERAL RADIOLOGY | Facility: CLINIC | Age: 57
End: 2022-11-07
Attending: FAMILY MEDICINE
Payer: COMMERCIAL

## 2022-11-07 ENCOUNTER — OFFICE VISIT (OUTPATIENT)
Dept: ORTHOPEDICS | Facility: CLINIC | Age: 57
End: 2022-11-07
Payer: COMMERCIAL

## 2022-11-07 VITALS
WEIGHT: 289 LBS | BODY MASS INDEX: 39.14 KG/M2 | DIASTOLIC BLOOD PRESSURE: 92 MMHG | HEIGHT: 72 IN | SYSTOLIC BLOOD PRESSURE: 134 MMHG

## 2022-11-07 DIAGNOSIS — M48.062 SPINAL STENOSIS OF LUMBAR REGION WITH NEUROGENIC CLAUDICATION: ICD-10-CM

## 2022-11-07 DIAGNOSIS — M48.062 SPINAL STENOSIS OF LUMBAR REGION WITH NEUROGENIC CLAUDICATION: Primary | ICD-10-CM

## 2022-11-07 DIAGNOSIS — M16.12 PRIMARY OSTEOARTHRITIS OF LEFT HIP: ICD-10-CM

## 2022-11-07 PROCEDURE — 99244 OFF/OP CNSLTJ NEW/EST MOD 40: CPT | Performed by: FAMILY MEDICINE

## 2022-11-07 PROCEDURE — 72100 X-RAY EXAM L-S SPINE 2/3 VWS: CPT | Mod: TC | Performed by: RADIOLOGY

## 2022-11-07 NOTE — LETTER
11/7/2022         RE: Eric Forbes  [address]  [address]  [place] [address]        Dear Colleague,    Thank you for referring your patient, Eric Forbes, to the [facility]. Please see a copy of my visit note below.    ASSESSMENT & PLAN    Eric was seen today for pain.    Diagnoses and all orders for this visit:    Primary osteoarthritis of left hip  -     Orthopedic  Referral      This issue is acute on chronic and Unchanged.    # Left Hip Pain: Symptoms noted over the past three years but has had a history of sciatic since being a child. He has pain over the lateral hip worse with prolonged standing and walking, improved with leaning on a grocery cart. He also has associated numbness and tingling in his foot when this happens. On examination today he does have limitations in range of motion of his hip but no tenderness to palpation over the hip joint or greater trochanter. X-rays previously taken showing significant left hip arthritis. I don't think most of his pain is coming from the left hip, more concern for lumbar spinal stenosis given x-rays today showing significant arthritis and lumbar spine. He may also have some irritated gluteal tendons that could be contributing to his pain as well. Symptoms aren't severe currently. Counseled patient on nature of condition and treatment options.  Given this plan as below, follow-up as needed  Image Findings: reviewed left hip x-rays   Treatment: Activities as tolerated, home exercises given today  Job: as tolerated  Medications/Injections: Limited tylenol/ibuprofen for pain for 1-2 weeks, none  Follow-up: In one month if symptoms do not improve, sooner if worsening  Can consider formal physical therapy, advanced imaging    Jacob Monte MD  [facility]    -----  Chief Complaint   Patient presents with     Left Hip - Pain       SUBJECTIVE  Eric DEL VALLE Emildianne is a/an 57  year old male who is seen in consultation at the request of  Peg Mar M.D. for evaluation of left hip pain.     The patient is seen by themselves.      Onset: 3 years(s) ago. Reports insidious onset without acute precipitating event. Saw PCP 10/27/22 and xrays were performed. Has HO sciatica since a child  Location of Pain: left lateral hip   Worsened by: walking, carrying   Better with: forward flexion, leaning on grocery cart   Treatments tried: liquid aspirin   Associated symptoms: numbness and tingling to his foot  Reviewed PCP notes.   Orthopedic/Surgical history: YES - Chronic hip pain   Social History/Occupation: construction     No family history pertinent to patient's problem today.      REVIEW OF SYSTEMS:  Review of Systems  Constitutional, HEENT, cardiovascular, pulmonary, GI, , musculoskeletal, neuro, skin, endocrine and psych systems are negative, except as otherwise noted.    OBJECTIVE:  Ht 1.829 m (6')   Wt 131.1 kg (289 lb)   BMI 39.20 kg/m     General: healthy, alert and in no distress  HEENT: no scleral icterus or conjunctival erythema  Skin: no suspicious lesions or rash. No jaundice.  CV: distal perfusion intact    Resp: normal respiratory effort without conversational dyspnea   Psych: normal mood and affect  Gait: normal steady gait with appropriate coordination and balance    Neuro: Normal light sensory exam of left lower extremity     Ortho Exam   LEFT HIP  Inspection:    No swelling, bruising, discoloration, or obvious deformity or asymmetry  Palpation:  Nontender.    Crepitus is Absent  Active Range of Motion:     Flexion limited by tightness, extension limited by tightness / IR limited by tightness / ER  limited by tightness  Strength:    Flexion 5/5 / extension 5/5 / adduction 5/5 / abduction 5/5  Special Tests:    Positive: None    Negative: Logroll, KAITLIN, anterior impingement (FADIR)    THORACIC/LUMBAR SPINE  Inspection:    No redness, swelling, overlying skin change,  gross deformity/asymmetry, scapular winging  Palpation:  Nontender.  Range of Motion:     Lumbar flexion full    Lumbar extension full    Right side bend full    Left side bend full    Right rotation full    Left rotation full  Strength:    5/5 - quadriceps, hamstrings, tibialis anterior, gastrocsoleus, and extensor hallicus longus  Special Tests:    Positive: None  Negative: straight leg raise (bilateral)    RADIOLOGY:  I independently, visualized and reviewed these images with the patient    Mild diffuse degenerative disc disease, significant facet arthropathy in the L4 through S1 levels      Reviewed left hip x-rays    EXAM: PELVIS AND HIP LEFT ONE VIEW  DATE/TIME: 10/27/2022 2:45 PM      INDICATION: Left hip pain.   COMPARISON: 8/16/2019.                                                                      IMPRESSION:  1.  Advanced left hip degenerative arthrosis, progressed. Advanced  joint space narrowing. Moderate-sized focus of subchondral cystic  change in the left acetabular roof. Small degenerative ossicle at the  superolateral joint margin.  2.  Moderate-advanced right hip degenerative arthrosis, stable.  3.  No fracture.     PRO MCPHERSON MD          Review of external notes as documented elsewhere in note  Review of the result(s) of each unique test - left hip x-rays     Disclaimer: This note consists of symbols derived from keyboarding, dictation and/or voice recognition software. As a result, there may be errors in the script that have gone undetected. Please consider this when interpreting information found in this chart.          Again, thank you for allowing me to participate in the care of your patient.        Sincerely,        Jacob Monte MD

## 2022-11-07 NOTE — PROGRESS NOTES
ASSESSMENT & PLAN    Eric was seen today for pain.    Diagnoses and all orders for this visit:    Primary osteoarthritis of left hip  -     Orthopedic  Referral      This issue is acute on chronic and Unchanged.    # Left Hip Pain: Symptoms noted over the past three years but has had a history of sciatic since being a child. He has pain over the lateral hip worse with prolonged standing and walking, improved with leaning on a grocery cart. He also has associated numbness and tingling in his foot when this happens. On examination today he does have limitations in range of motion of his hip but no tenderness to palpation over the hip joint or greater trochanter. X-rays previously taken showing significant left hip arthritis. I don't think most of his pain is coming from the left hip, more concern for lumbar spinal stenosis given x-rays today showing significant arthritis and lumbar spine. He may also have some irritated gluteal tendons that could be contributing to his pain as well. Symptoms aren't severe currently. Counseled patient on nature of condition and treatment options.  Given this plan as below, follow-up as needed  Image Findings: reviewed left hip x-rays   Treatment: Activities as tolerated, home exercises given today  Job: as tolerated  Medications/Injections: Limited tylenol/ibuprofen for pain for 1-2 weeks, none  Follow-up: In one month if symptoms do not improve, sooner if worsening  Can consider formal physical therapy, advanced imaging    Jacob Monte MD  [facility] [place]    -----  Chief Complaint   Patient presents with     Left Hip - Pain       SUBJECTIVE  Eric Forbes is a/an 57 year old male who is seen in consultation at the request of  Peg Mar M.D. for evaluation of left hip pain.     The patient is seen by themselves.      Onset: 3 years(s) ago. Reports insidious onset without acute precipitating event. Saw PCP 10/27/22 and xrays  were performed. Has HO sciatica since a child  Location of Pain: left lateral hip   Worsened by: walking, carrying   Better with: forward flexion, leaning on grocery cart   Treatments tried: liquid aspirin   Associated symptoms: numbness and tingling to his foot  Reviewed PCP notes.   Orthopedic/Surgical history: YES - Chronic hip pain   Social History/Occupation: construction     No family history pertinent to patient's problem today.      REVIEW OF SYSTEMS:  Review of Systems  Constitutional, HEENT, cardiovascular, pulmonary, GI, , musculoskeletal, neuro, skin, endocrine and psych systems are negative, except as otherwise noted.    OBJECTIVE:  Ht 1.829 m (6')   Wt 131.1 kg (289 lb)   BMI 39.20 kg/m     General: healthy, alert and in no distress  HEENT: no scleral icterus or conjunctival erythema  Skin: no suspicious lesions or rash. No jaundice.  CV: distal perfusion intact    Resp: normal respiratory effort without conversational dyspnea   Psych: normal mood and affect  Gait: normal steady gait with appropriate coordination and balance    Neuro: Normal light sensory exam of left lower extremity     Ortho Exam   LEFT HIP  Inspection:    No swelling, bruising, discoloration, or obvious deformity or asymmetry  Palpation:  Nontender.    Crepitus is Absent  Active Range of Motion:     Flexion limited by tightness, extension limited by tightness / IR limited by tightness / ER  limited by tightness  Strength:    Flexion 5/5 / extension 5/5 / adduction 5/5 / abduction 5/5  Special Tests:    Positive: None    Negative: Logroll, KAITLIN, anterior impingement (FADIR)    THORACIC/LUMBAR SPINE  Inspection:    No redness, swelling, overlying skin change, gross deformity/asymmetry, scapular winging  Palpation:  Nontender.  Range of Motion:     Lumbar flexion full    Lumbar extension full    Right side bend full    Left side bend full    Right rotation full    Left rotation full  Strength:    5/5 - quadriceps, hamstrings,  tibialis anterior, gastrocsoleus, and extensor hallicus longus  Special Tests:    Positive: None  Negative: straight leg raise (bilateral)    RADIOLOGY:  I independently, visualized and reviewed these images with the patient    Mild diffuse degenerative disc disease, significant facet arthropathy in the L4 through S1 levels      Reviewed left hip x-rays    EXAM: PELVIS AND HIP LEFT ONE VIEW  DATE/TIME: 10/27/2022 2:45 PM      INDICATION: Left hip pain.   COMPARISON: 8/16/2019.                                                                      IMPRESSION:  1.  Advanced left hip degenerative arthrosis, progressed. Advanced  joint space narrowing. Moderate-sized focus of subchondral cystic  change in the left acetabular roof. Small degenerative ossicle at the  superolateral joint margin.  2.  Moderate-advanced right hip degenerative arthrosis, stable.  3.  No fracture.     PRO MCPHERSON MD          Review of external notes as documented elsewhere in note  Review of the result(s) of each unique test - left hip x-rays     Disclaimer: This note consists of symbols derived from keyboarding, dictation and/or voice recognition software. As a result, there may be errors in the script that have gone undetected. Please consider this when interpreting information found in this chart.

## 2022-11-07 NOTE — PATIENT INSTRUCTIONS
# Left Hip Pain: Symptoms noted over the past three years but has had a history of sciatic since being a child. He has pain over the lateral hip worse with prolonged standing and walking, improved with leaning on a grocery cart. He also has associated numbness and tingling in his foot when this happens. On examination today he does have limitations in range of motion of his hip but no tenderness to palpation over the hip joint or greater trochanter. X-rays previously taken showing significant left hip arthritis. I don't think most of his pain is coming from the left hip, more concern for lumbar spinal stenosis given x-rays today showing significant arthritis and lumbar spine. He may also have some irritated gluteal tendons that could be contributing to his pain as well. Symptoms aren't severe currently. Counseled patient on nature of condition and treatment options.  Given this plan as below, follow-up as needed  Image Findings: reviewed left hip x-rays   Treatment: Activities as tolerated, home exercises given today  Job: as tolerated  Medications/Injections: Limited tylenol/ibuprofen for pain for 1-2 weeks, none  Follow-up: In one month if symptoms do not improve, sooner if worsening  Can consider formal physical therapy, advanced imaging    Please call [phone]   Ask for my team if you have any questions or concerns    If you have not yet received the influenza vaccine but would like to get one, please call  [phone] or you can schedule via [employer]    It was great seeing you today!    Jacob Monte MD, [facility]

## 2022-11-19 ENCOUNTER — HEALTH MAINTENANCE LETTER (OUTPATIENT)
Age: 57
End: 2022-11-19

## 2023-03-03 ENCOUNTER — TELEPHONE (OUTPATIENT)
Dept: FAMILY MEDICINE | Facility: CLINIC | Age: 58
End: 2023-03-03
Payer: COMMERCIAL

## 2023-03-03 NOTE — TELEPHONE ENCOUNTER
Reason for Call:  Appointment Request    Patient requesting this type of appt:  Preventive     Requested provider: Rj Sharpe    Reason patient unable to be scheduled: Not within requested timeframe    When does patient want to be seen/preferred time: 1-2 weeks    Comments: Patient looking to be seen for a annual physical with PCP. He is looking to be seen so that he can receive his cortisone injections on his left hip. Patient wondering if he can schedule a physical appt and have the orders made for the injection or if he needs to be seen prior to. Please return patient call.     Could we send this information to you in [place] or would you prefer to receive a phone call?:   Patient would prefer a phone call   Okay to leave a detailed message?: Yes at  [place] -: [phone]    Call taken on 3/3/2023 at 4:52 PM by Kayla Serrano

## 2023-03-08 NOTE — TELEPHONE ENCOUNTER
Advised patient to schedule with another provider if he does not want to wait.  Suzy Urbano PSC on 3/8/2023 at 12:26 PM

## 2023-05-26 ENCOUNTER — OFFICE VISIT (OUTPATIENT)
Dept: ORTHOPEDICS | Facility: CLINIC | Age: 58
End: 2023-05-26
Payer: COMMERCIAL

## 2023-05-26 VITALS — SYSTOLIC BLOOD PRESSURE: 142 MMHG | DIASTOLIC BLOOD PRESSURE: 82 MMHG | HEART RATE: 70 BPM

## 2023-05-26 DIAGNOSIS — M54.50 CHRONIC LEFT-SIDED LOW BACK PAIN, UNSPECIFIED WHETHER SCIATICA PRESENT: Primary | ICD-10-CM

## 2023-05-26 DIAGNOSIS — M16.12 PRIMARY OSTEOARTHRITIS OF LEFT HIP: ICD-10-CM

## 2023-05-26 DIAGNOSIS — G89.29 CHRONIC LEFT-SIDED LOW BACK PAIN, UNSPECIFIED WHETHER SCIATICA PRESENT: Primary | ICD-10-CM

## 2023-05-26 PROCEDURE — 99214 OFFICE O/P EST MOD 30 MIN: CPT | Performed by: FAMILY MEDICINE

## 2023-05-26 RX ORDER — METHYLPREDNISOLONE 4 MG
TABLET, DOSE PACK ORAL
Qty: 21 TABLET | Refills: 0 | Status: SHIPPED | OUTPATIENT
Start: 2023-05-26

## 2023-05-26 ASSESSMENT — PAIN SCALES - GENERAL: PAINLEVEL: MILD PAIN (2)

## 2023-05-26 NOTE — PROGRESS NOTES
ASSESSMENT & PLAN    Eric was seen today for follow up, follow up and follow up.    Diagnoses and all orders for this visit:    Chronic left-sided low back pain, unspecified whether sciatica present  -     methylPREDNISolone (MEDROL DOSEPAK) 4 MG tablet therapy pack; Follow Package Directions    Primary osteoarthritis of left hip  -     methylPREDNISolone (MEDROL DOSEPAK) 4 MG tablet therapy pack; Follow Package Directions        # Left Hip/Low Back Pain: Symptoms noted over the past three years but has had a history of sciatic since being a child. There was a concern for lumbar stenosis previously. Pain improved today. He is pointing more to the left lower back, not so much the hip.  On examination today he does have limitations in range of motion of his hip but no tenderness to palpation over the hip joint or greater trochanter. X-rays previously taken showing significant left hip arthritis. I don't think most of his pain is coming from the left hip, more concern for lumbar spinal stenosis given x-rays today showing significant arthritis in the lumbar spine. He may also have some irritated gluteal tendons that could be contributing to his pain as well. Symptoms aren't severe currently. Counseled patient on nature of condition and treatment options.  Given this plan as below, follow-up as needed  Image Findings: reviewed left hip x-rays   Treatment: Activities as tolerated, home exercises given today  Job: as tolerated  Medications/Injections: Medrol dose pack, none  Follow-up: 2-4 weeks if not improving, sooner if worsening  Can consider: Left hip joint steroid injection  -----    SUBJECTIVE:  Eric Forbes is a 57 year old male who is seen in follow-up for bilateral hip pain. They were last seen 11/7/22.  The patient is seen by themselves.    Since their last visit reports improved bilateral hip pain.  Most pain in the left posterior hip/left sided low back.  They indicate that their current pain level is 2/10.  They have tried liquid aspirin.  Has insurance running out not sure when.       Patient's past medical, surgical, social, and family histories were reviewed today and no changes are noted.    REVIEW OF SYSTEMS:  Constitutional: NEGATIVE for fever, chills, change in weight  Skin: NEGATIVE for worrisome rashes, moles or lesions  GI/: NEGATIVE for bowel or bladder changes  Neuro: NEGATIVE for weakness, dizziness or paresthesias    OBJECTIVE:  BP (!) 142/82   Pulse 70    General: healthy, alert and in no distress  HEENT: no scleral icterus or conjunctival erythema  Skin: no suspicious lesions or rash. No jaundice.  CV: regular rhythm by palpation, no pedal edema  Resp: normal respiratory effort without conversational dyspnea   Psych: normal mood and affect  Gait: normal steady gait with appropriate coordination and balance  Neuro: normal light touch sensory exam of the extremities.    MSK:    LEFT HIP  Inspection:    No swelling, bruising, discoloration, or obvious deformity or asymmetry  Palpation:    Tender about the anterior groin/joint line. Otherwise all other landmarks are nontender.    Crepitus is Absent  Active Range of Motion:     Flexion full, extension full / IR limited by tightness / ER  full  Strength:    Flexion 5/5 / extension 5/5 / adduction 5/5 / abduction 5/5  Special Tests:    Positive: None    Negative: Logroll, KAITLIN, anterior impingement (FADIR)    THORACIC/LUMBAR SPINE  Inspection:    No redness, swelling, overlying skin change, gross deformity/asymmetry, scapular winging  Palpation:    Tender about the left para lumbar muscles. Otherwise remainder of landmarks are nontender.  Range of Motion:     Lumbar flexion full    Lumbar extension full    Right side bend full    Left side bend full    Right rotation full    Left rotation full  Strength:    5/5 - quadriceps, hamstrings, tibialis anterior, gastrocsoleus, and extensor hallicus longus  Special Tests:    Positive: None  Negative: straight leg  raise (left), slump test (left)      Independent visualization of the below image:  EXAM: PELVIS AND HIP LEFT ONE VIEW  DATE/TIME: 10/27/2022 2:45 PM      INDICATION: Left hip pain.   COMPARISON: 8/16/2019.                                                                      IMPRESSION:  1.  Advanced left hip degenerative arthrosis, progressed. Advanced  joint space narrowing. Moderate-sized focus of subchondral cystic  change in the left acetabular roof. Small degenerative ossicle at the  superolateral joint margin.  2.  Moderate-advanced right hip degenerative arthrosis, stable.  3.  No fracture.     PRO MCPHERSON MD     LUMBAR SPINE TWO TO THREE VIEWS November 7, 2022 10:13 AM      HISTORY: Concern for left lumbar spinal stenosis. Spinal stenosis of  lumbar region with neurogenic claudication.     COMPARISON: None. Correlation made with pelvis radiographs 10/27/2022.                                                                      IMPRESSION: Nomenclature is based on five lumbar vertebral bodies.  Minimal sigmoid curvature of the lumbar spinal or frontal view. There  appears to be trace retrolisthesis of L2 on L3 and L3 on L4. Alignment  otherwise appears normal. Mild multilevel disc space narrowing with  marginal endplate osteophytes. Multilevel degenerative facet disease,  appearing most pronounced in the lower lumbar region. Advanced left  and moderate to advanced right hip joint space narrowing, concerning  for osteoarthritis, as seen on prior pelvis radiographs.     MD Jacob CLARK MD, [facility]    Disclaimer: This note consists of symbols derived from keyboarding, dictation and/or voice recognition software. As a result, there may be errors in the script that have gone undetected. Please consider this when interpreting information found in this chart.

## 2023-05-26 NOTE — PATIENT INSTRUCTIONS
# Left Hip/Low Back Pain: Symptoms noted over the past three years but has had a history of sciatic since being a child. There was a concern for lumbar stenosis previously. Pain improved today. He is pointing more to the left lower back, not so much the hip.  On examination today he does have limitations in range of motion of his hip but no tenderness to palpation over the hip joint or greater trochanter. X-rays previously taken showing significant left hip arthritis. I don't think most of his pain is coming from the left hip, more concern for lumbar spinal stenosis given x-rays today showing significant arthritis in the lumbar spine. He may also have some irritated gluteal tendons that could be contributing to his pain as well. Symptoms aren't severe currently. Counseled patient on nature of condition and treatment options.  Given this plan as below, follow-up as needed  Image Findings: reviewed left hip x-rays   Treatment: Activities as tolerated, home exercises given today  Job: as tolerated  Medications/Injections: Medrol dose pack, none  Follow-up: 2-4 weeks if not improving, sooner if worsening  Can consider: Left hip joint steroid injection    It was great seeing you again today!    Jacob Monte

## 2023-05-26 NOTE — LETTER
5/26/2023         RE: Eric Forbes  [address]  [address]  [place] [address]        Dear Colleague,    Thank you for referring your patient, Eric Forbes, to the [facility] [place]. Please see a copy of my visit note below.    ASSESSMENT & PLAN    Eric was seen today for follow up, follow up and follow up.    Diagnoses and all orders for this visit:    Chronic left-sided low back pain, unspecified whether sciatica present  -     methylPREDNISolone (MEDROL DOSEPAK) 4 MG tablet therapy pack; Follow Package Directions    Primary osteoarthritis of left hip  -     methylPREDNISolone (MEDROL DOSEPAK) 4 MG tablet therapy pack; Follow Package Directions        # Left Hip/Low Back Pain: Symptoms noted over the past three years but has had a history of sciatic since being a child. There was a concern for lumbar stenosis previously. Pain improved today. He is pointing more to the left lower back, not so much the hip.  On examination today he does have limitations in range of motion of his hip but no tenderness to palpation over the hip joint or greater trochanter. X-rays previously taken showing significant left hip arthritis. I don't think most of his pain is coming from the left hip, more concern for lumbar spinal stenosis given x-rays today showing significant arthritis in the lumbar spine. He may also have some irritated gluteal tendons that could be contributing to his pain as well. Symptoms aren't severe currently. Counseled patient on nature of condition and treatment options.  Given this plan as below, follow-up as needed  Image Findings: reviewed left hip x-rays   Treatment: Activities as tolerated, home exercises given today  Job: as tolerated  Medications/Injections: Medrol dose pack, none  Follow-up: 2-4 weeks if not improving, sooner if worsening  Can consider: Left hip joint steroid injection  -----    SUBJECTIVE:  Eric Forbes is a 57 year old male who is seen in  follow-up for bilateral hip pain. They were last seen 11/7/22.  The patient is seen by themselves.    Since their last visit reports improved bilateral hip pain.  Most pain in the left posterior hip/left sided low back.  They indicate that their current pain level is 2/10. They have tried liquid aspirin.  Has insurance running out not sure when.       Patient's past medical, surgical, social, and family histories were reviewed today and no changes are noted.    REVIEW OF SYSTEMS:  Constitutional: NEGATIVE for fever, chills, change in weight  Skin: NEGATIVE for worrisome rashes, moles or lesions  GI/: NEGATIVE for bowel or bladder changes  Neuro: NEGATIVE for weakness, dizziness or paresthesias    OBJECTIVE:  BP (!) 142/82   Pulse 70    General: healthy, alert and in no distress  HEENT: no scleral icterus or conjunctival erythema  Skin: no suspicious lesions or rash. No jaundice.  CV: regular rhythm by palpation, no pedal edema  Resp: normal respiratory effort without conversational dyspnea   Psych: normal mood and affect  Gait: normal steady gait with appropriate coordination and balance  Neuro: normal light touch sensory exam of the extremities.    MSK:    LEFT HIP  Inspection:    No swelling, bruising, discoloration, or obvious deformity or asymmetry  Palpation:    Tender about the anterior groin/joint line. Otherwise all other landmarks are nontender.    Crepitus is Absent  Active Range of Motion:     Flexion full, extension full / IR limited by tightness / ER  full  Strength:    Flexion 5/5 / extension 5/5 / adduction 5/5 / abduction 5/5  Special Tests:    Positive: None    Negative: Logroll, KAITLIN, anterior impingement (FADIR)    THORACIC/LUMBAR SPINE  Inspection:    No redness, swelling, overlying skin change, gross deformity/asymmetry, scapular winging  Palpation:    Tender about the left para lumbar muscles. Otherwise remainder of landmarks are nontender.  Range of Motion:     Lumbar flexion full     Lumbar extension full    Right side bend full    Left side bend full    Right rotation full    Left rotation full  Strength:    5/5 - quadriceps, hamstrings, tibialis anterior, gastrocsoleus, and extensor hallicus longus  Special Tests:    Positive: None  Negative: straight leg raise (left), slump test (left)      Independent visualization of the below image:  EXAM: PELVIS AND HIP LEFT ONE VIEW  DATE/TIME: 10/27/2022 2:45 PM      INDICATION: Left hip pain.   COMPARISON: 8/16/2019.                                                                      IMPRESSION:  1.  Advanced left hip degenerative arthrosis, progressed. Advanced  joint space narrowing. Moderate-sized focus of subchondral cystic  change in the left acetabular roof. Small degenerative ossicle at the  superolateral joint margin.  2.  Moderate-advanced right hip degenerative arthrosis, stable.  3.  No fracture.     PRO MCPHERSON MD     LUMBAR SPINE TWO TO THREE VIEWS November 7, 2022 10:13 AM      HISTORY: Concern for left lumbar spinal stenosis. Spinal stenosis of  lumbar region with neurogenic claudication.     COMPARISON: None. Correlation made with pelvis radiographs 10/27/2022.                                                                      IMPRESSION: Nomenclature is based on five lumbar vertebral bodies.  Minimal sigmoid curvature of the lumbar spinal or frontal view. There  appears to be trace retrolisthesis of L2 on L3 and L3 on L4. Alignment  otherwise appears normal. Mild multilevel disc space narrowing with  marginal endplate osteophytes. Multilevel degenerative facet disease,  appearing most pronounced in the lower lumbar region. Advanced left  and moderate to advanced right hip joint space narrowing, concerning  for osteoarthritis, as seen on prior pelvis radiographs.     MD Jacob CLARK MD, [facility]    Disclaimer: This note consists of symbols derived from keyboarding,  dictation and/or voice recognition software. As a result, there may be errors in the script that have gone undetected. Please consider this when interpreting information found in this chart.          Again, thank you for allowing me to participate in the care of your patient.        Sincerely,        Jacob Monte MD

## 2023-06-01 ENCOUNTER — HEALTH MAINTENANCE LETTER (OUTPATIENT)
Age: 58
End: 2023-06-01

## 2024-06-16 ENCOUNTER — HEALTH MAINTENANCE LETTER (OUTPATIENT)
Age: 59
End: 2024-06-16

## 2025-06-21 ENCOUNTER — HEALTH MAINTENANCE LETTER (OUTPATIENT)
Age: 60
End: 2025-06-21

## (undated) DEVICE — ENDO SNARE EXACTO COLD 9MM LOOP 2.4MMX230CM 00711115

## (undated) DEVICE — ENDO FORCEP ENDOJAW BIOPSY 3.7MMX230CM FB-222U